# Patient Record
Sex: MALE | Race: WHITE | NOT HISPANIC OR LATINO | Employment: OTHER | RURAL
[De-identification: names, ages, dates, MRNs, and addresses within clinical notes are randomized per-mention and may not be internally consistent; named-entity substitution may affect disease eponyms.]

---

## 2017-01-09 RX ORDER — METOPROLOL SUCCINATE 50 MG/1
TABLET, EXTENDED RELEASE ORAL
Qty: 180 TABLET | Refills: 3 | Status: SHIPPED | OUTPATIENT
Start: 2017-01-09 | End: 2017-02-09

## 2017-02-09 ENCOUNTER — OFFICE VISIT (OUTPATIENT)
Dept: CARDIOLOGY | Facility: CLINIC | Age: 77
End: 2017-02-09

## 2017-02-09 VITALS
HEART RATE: 87 BPM | BODY MASS INDEX: 35.5 KG/M2 | SYSTOLIC BLOOD PRESSURE: 118 MMHG | HEIGHT: 70 IN | RESPIRATION RATE: 22 BRPM | DIASTOLIC BLOOD PRESSURE: 56 MMHG | WEIGHT: 248 LBS

## 2017-02-09 DIAGNOSIS — I48.20 CHRONIC ATRIAL FIBRILLATION (HCC): Primary | ICD-10-CM

## 2017-02-09 DIAGNOSIS — I50.42 CHRONIC COMBINED SYSTOLIC AND DIASTOLIC CONGESTIVE HEART FAILURE (HCC): ICD-10-CM

## 2017-02-09 PROCEDURE — 99213 OFFICE O/P EST LOW 20 MIN: CPT | Performed by: INTERNAL MEDICINE

## 2017-02-09 PROCEDURE — 93000 ELECTROCARDIOGRAM COMPLETE: CPT | Performed by: INTERNAL MEDICINE

## 2017-02-09 RX ORDER — FLUTICASONE PROPIONATE 50 MCG
2 SPRAY, SUSPENSION (ML) NASAL DAILY
COMMUNITY

## 2017-02-09 RX ORDER — METOPROLOL SUCCINATE 50 MG/1
50 TABLET, EXTENDED RELEASE ORAL 2 TIMES DAILY
COMMUNITY
End: 2017-11-30 | Stop reason: SDUPTHER

## 2017-02-09 RX ORDER — ACETAMINOPHEN,DIPHENHYDRAMINE HCL 500; 25 MG/1; MG/1
1 TABLET, FILM COATED ORAL NIGHTLY PRN
COMMUNITY

## 2017-02-09 NOTE — PROGRESS NOTES
Subjective:     Encounter Date:02/09/2017      Patient ID: Izaiah Ravi is a 76 y.o. male.    Chief Complaint: CAF, CHF    History of Present Illness     Dear Dr. Godfrey,    I had the pleasure of seeing the patient in cardiac followup today. As you well know, he is a rupa 76-year-old man with history of chronic atrial fibrillation and mild cardiomyopathy. With medical treatment, his ejection fraction improved to 50%. Last year he was doing very well with some occasional dietary indiscretion.     He comes in now for his yearly followup. Since I have last seen him, he has been suffering from C. difficile colitis. He is just starting to recover. Despite this, his warfarin level has stayed in good range, and his heart failure class is stable at I.         Review of Systems   All other systems reviewed and are negative.        ECG 12 Lead  Date/Time: 2/9/2017 4:01 PM  Performed by: CARLOS KNOWLES  Authorized by: CARLOS KNOWLES   Comparison: compared with previous ECG   Similar to previous ECG  Rhythm: atrial fibrillation  BPM: 87                 Objective:     Physical Exam   Constitutional: He is oriented to person, place, and time. He appears well-developed and well-nourished.   HENT:   Head: Normocephalic and atraumatic.   Neck: Normal range of motion. Neck supple.   Cardiovascular: Normal rate and normal heart sounds.  An irregularly irregular rhythm present.   Pulmonary/Chest: Effort normal and breath sounds normal.   Abdominal: Soft. Bowel sounds are normal.   Musculoskeletal: Normal range of motion.   Neurological: He is alert and oriented to person, place, and time.   Skin: Skin is warm and dry.   Psychiatric: He has a normal mood and affect. His behavior is normal. Thought content normal.   Vitals reviewed.      Lab Review:       Assessment:          Diagnosis Plan   1. Chronic atrial fibrillation     2. Chronic combined systolic and diastolic congestive heart failure            Plan:        It was a pleasure to  see your patient in cardiac followup today.  He is stable from the standpoint of his chronic atrial fibrillation.  He remains on warfarin for stroke prevention.  His heart failure class remains 1.  I have recommended no changes at this time.  He will see me again in one year or sooner if symptoms warrant.      Atrial Fibrillation and Atrial Flutter  Assessment  • The patient has permanent atrial fibrillation  • This is non-valvular in etiology  • The patient's CHADS2-VASc score is 3  • A [id number] score of 2 or more is considered a high risk for a thromboembolic event  • Warfarin prescribed  • Aspirin prescribed    Plan  • Continue in atrial fibrillation with rate control  • Continue aspirin and warfarin for antithrombotic therapy, bleeding issues discussed  • Continue beta blocker and digoxin for rate control

## 2017-05-28 ENCOUNTER — HOSPITAL ENCOUNTER (INPATIENT)
Facility: HOSPITAL | Age: 77
LOS: 4 days | Discharge: HOME | End: 2017-06-01
Attending: HOSPITALIST | Admitting: HOSPITALIST

## 2017-05-28 ENCOUNTER — APPOINTMENT (OUTPATIENT)
Dept: CT IMAGING | Facility: HOSPITAL | Age: 77
End: 2017-05-28

## 2017-05-28 DIAGNOSIS — Z74.09 IMPAIRED FUNCTIONAL MOBILITY, BALANCE, GAIT, AND ENDURANCE: Primary | ICD-10-CM

## 2017-05-28 DIAGNOSIS — S06.5XAA SUBDURAL HEMATOMA (HCC): ICD-10-CM

## 2017-05-28 PROBLEM — I10 HTN (HYPERTENSION): Status: ACTIVE | Noted: 2017-05-28

## 2017-05-28 PROBLEM — E87.1 HYPONATREMIA: Status: ACTIVE | Noted: 2017-05-28

## 2017-05-28 PROBLEM — R73.9 HYPERGLYCEMIA: Status: ACTIVE | Noted: 2017-05-28

## 2017-05-28 PROBLEM — D72.829 LEUKOCYTOSIS: Status: ACTIVE | Noted: 2017-05-28

## 2017-05-28 LAB
ANION GAP SERPL CALCULATED.3IONS-SCNC: 17.1 MMOL/L
BUN BLD-MCNC: 23 MG/DL (ref 8–23)
BUN/CREAT SERPL: 16.8 (ref 7–25)
CALCIUM SPEC-SCNC: 9.8 MG/DL (ref 8.6–10.5)
CHLORIDE SERPL-SCNC: 89 MMOL/L (ref 98–107)
CO2 SERPL-SCNC: 23.9 MMOL/L (ref 22–29)
CREAT BLD-MCNC: 1.37 MG/DL (ref 0.76–1.27)
DEPRECATED RDW RBC AUTO: 60.3 FL (ref 37–54)
ERYTHROCYTE [DISTWIDTH] IN BLOOD BY AUTOMATED COUNT: 21.2 % (ref 11.5–14.5)
GFR SERPL CREATININE-BSD FRML MDRD: 50 ML/MIN/1.73
GLUCOSE BLD-MCNC: 163 MG/DL (ref 65–99)
HCT VFR BLD AUTO: 39.9 % (ref 40.4–52.2)
HGB BLD-MCNC: 13 G/DL (ref 13.7–17.6)
INR PPP: 1.69 (ref 0.9–1.1)
MCH RBC QN AUTO: 25.6 PG (ref 27–32.7)
MCHC RBC AUTO-ENTMCNC: 32.6 G/DL (ref 32.6–36.4)
MCV RBC AUTO: 78.5 FL (ref 79.8–96.2)
PLATELET # BLD AUTO: 298 10*3/MM3 (ref 140–500)
PMV BLD AUTO: 10.7 FL (ref 6–12)
POTASSIUM BLD-SCNC: 4.5 MMOL/L (ref 3.5–5.2)
PROTHROMBIN TIME: 19.3 SECONDS (ref 11.7–14.2)
RBC # BLD AUTO: 5.08 10*6/MM3 (ref 4.6–6)
SODIUM BLD-SCNC: 130 MMOL/L (ref 136–145)
WBC NRBC COR # BLD: 17.96 10*3/MM3 (ref 4.5–10.7)

## 2017-05-28 PROCEDURE — 85610 PROTHROMBIN TIME: CPT | Performed by: HOSPITALIST

## 2017-05-28 PROCEDURE — 80048 BASIC METABOLIC PNL TOTAL CA: CPT | Performed by: HOSPITALIST

## 2017-05-28 PROCEDURE — 93005 ELECTROCARDIOGRAM TRACING: CPT | Performed by: HOSPITALIST

## 2017-05-28 PROCEDURE — 85027 COMPLETE CBC AUTOMATED: CPT | Performed by: HOSPITALIST

## 2017-05-28 PROCEDURE — 93010 ELECTROCARDIOGRAM REPORT: CPT | Performed by: INTERNAL MEDICINE

## 2017-05-28 PROCEDURE — 70450 CT HEAD/BRAIN W/O DYE: CPT

## 2017-05-28 RX ORDER — FUROSEMIDE 20 MG/1
20 TABLET ORAL 2 TIMES DAILY
Status: DISCONTINUED | OUTPATIENT
Start: 2017-05-29 | End: 2017-06-01 | Stop reason: HOSPADM

## 2017-05-28 RX ORDER — TERAZOSIN 2 MG/1
2 CAPSULE ORAL NIGHTLY
Status: DISCONTINUED | OUTPATIENT
Start: 2017-05-29 | End: 2017-06-01 | Stop reason: HOSPADM

## 2017-05-28 RX ORDER — BENAZEPRIL HYDROCHLORIDE 20 MG/1
20 TABLET ORAL DAILY
Status: DISCONTINUED | OUTPATIENT
Start: 2017-05-29 | End: 2017-05-29 | Stop reason: CLARIF

## 2017-05-28 RX ORDER — ONDANSETRON 4 MG/1
4 TABLET, FILM COATED ORAL EVERY 6 HOURS PRN
Status: DISCONTINUED | OUTPATIENT
Start: 2017-05-28 | End: 2017-06-01 | Stop reason: HOSPADM

## 2017-05-28 RX ORDER — METOPROLOL SUCCINATE 50 MG/1
50 TABLET, EXTENDED RELEASE ORAL 2 TIMES DAILY
Status: DISCONTINUED | OUTPATIENT
Start: 2017-05-29 | End: 2017-06-01 | Stop reason: HOSPADM

## 2017-05-28 RX ORDER — SODIUM CHLORIDE 0.9 % (FLUSH) 0.9 %
1-10 SYRINGE (ML) INJECTION AS NEEDED
Status: DISCONTINUED | OUTPATIENT
Start: 2017-05-28 | End: 2017-05-30

## 2017-05-28 RX ORDER — ATORVASTATIN CALCIUM 10 MG/1
10 TABLET, FILM COATED ORAL NIGHTLY
Status: DISCONTINUED | OUTPATIENT
Start: 2017-05-28 | End: 2017-06-01 | Stop reason: HOSPADM

## 2017-05-28 RX ORDER — ONDANSETRON 4 MG/1
4 TABLET, ORALLY DISINTEGRATING ORAL EVERY 6 HOURS PRN
Status: DISCONTINUED | OUTPATIENT
Start: 2017-05-28 | End: 2017-06-01 | Stop reason: HOSPADM

## 2017-05-28 RX ORDER — METOPROLOL SUCCINATE 50 MG/1
50 TABLET, EXTENDED RELEASE ORAL ONCE
Status: COMPLETED | OUTPATIENT
Start: 2017-05-28 | End: 2017-05-28

## 2017-05-28 RX ORDER — SODIUM CHLORIDE 9 MG/ML
100 INJECTION, SOLUTION INTRAVENOUS CONTINUOUS
Status: DISCONTINUED | OUTPATIENT
Start: 2017-05-28 | End: 2017-05-31 | Stop reason: ALTCHOICE

## 2017-05-28 RX ORDER — BISACODYL 5 MG/1
5 TABLET, DELAYED RELEASE ORAL DAILY PRN
Status: DISCONTINUED | OUTPATIENT
Start: 2017-05-28 | End: 2017-06-01 | Stop reason: HOSPADM

## 2017-05-28 RX ORDER — ACETAMINOPHEN 325 MG/1
650 TABLET ORAL EVERY 4 HOURS PRN
Status: DISCONTINUED | OUTPATIENT
Start: 2017-05-28 | End: 2017-05-31

## 2017-05-28 RX ORDER — DIGOXIN 125 MCG
125 TABLET ORAL DAILY
Status: DISCONTINUED | OUTPATIENT
Start: 2017-05-29 | End: 2017-06-01 | Stop reason: HOSPADM

## 2017-05-28 RX ORDER — DIGOXIN 125 MCG
125 TABLET ORAL ONCE
Status: COMPLETED | OUTPATIENT
Start: 2017-05-28 | End: 2017-05-28

## 2017-05-28 RX ORDER — FAMOTIDINE 20 MG/1
20 TABLET, FILM COATED ORAL 2 TIMES DAILY
Status: DISCONTINUED | OUTPATIENT
Start: 2017-05-28 | End: 2017-05-30

## 2017-05-28 RX ORDER — ONDANSETRON 2 MG/ML
4 INJECTION INTRAMUSCULAR; INTRAVENOUS EVERY 6 HOURS PRN
Status: DISCONTINUED | OUTPATIENT
Start: 2017-05-28 | End: 2017-06-01 | Stop reason: HOSPADM

## 2017-05-28 RX ORDER — BISACODYL 10 MG
10 SUPPOSITORY, RECTAL RECTAL DAILY PRN
Status: DISCONTINUED | OUTPATIENT
Start: 2017-05-28 | End: 2017-06-01 | Stop reason: HOSPADM

## 2017-05-28 RX ORDER — HYDROCODONE BITARTRATE AND ACETAMINOPHEN 5; 325 MG/1; MG/1
1 TABLET ORAL EVERY 4 HOURS PRN
Status: DISCONTINUED | OUTPATIENT
Start: 2017-05-28 | End: 2017-06-01 | Stop reason: HOSPADM

## 2017-05-28 RX ORDER — DOXAZOSIN 2 MG/1
2 TABLET ORAL DAILY
Status: DISCONTINUED | OUTPATIENT
Start: 2017-05-29 | End: 2017-05-28 | Stop reason: CLARIF

## 2017-05-28 RX ORDER — NITROGLYCERIN 0.4 MG/1
0.4 TABLET SUBLINGUAL
Status: DISCONTINUED | OUTPATIENT
Start: 2017-05-28 | End: 2017-06-01 | Stop reason: HOSPADM

## 2017-05-28 RX ORDER — FLUTICASONE PROPIONATE 50 MCG
2 SPRAY, SUSPENSION (ML) NASAL DAILY
Status: DISCONTINUED | OUTPATIENT
Start: 2017-05-29 | End: 2017-06-01 | Stop reason: HOSPADM

## 2017-05-28 RX ADMIN — DIGOXIN 125 MCG: 125 TABLET ORAL at 23:20

## 2017-05-28 RX ADMIN — METOPROLOL SUCCINATE 50 MG: 50 TABLET, FILM COATED, EXTENDED RELEASE ORAL at 23:20

## 2017-05-28 RX ADMIN — SODIUM CHLORIDE 100 ML/HR: 9 INJECTION, SOLUTION INTRAVENOUS at 22:12

## 2017-05-29 ENCOUNTER — APPOINTMENT (OUTPATIENT)
Dept: CT IMAGING | Facility: HOSPITAL | Age: 77
End: 2017-05-29

## 2017-05-29 ENCOUNTER — APPOINTMENT (OUTPATIENT)
Dept: MRI IMAGING | Facility: HOSPITAL | Age: 77
End: 2017-05-29

## 2017-05-29 PROBLEM — G93.5 BRAIN COMPRESSION (HCC): Status: ACTIVE | Noted: 2017-05-29

## 2017-05-29 LAB
ANION GAP SERPL CALCULATED.3IONS-SCNC: 16.9 MMOL/L
BUN BLD-MCNC: 22 MG/DL (ref 8–23)
BUN/CREAT SERPL: 19.6 (ref 7–25)
CALCIUM SPEC-SCNC: 9.4 MG/DL (ref 8.6–10.5)
CHLORIDE SERPL-SCNC: 94 MMOL/L (ref 98–107)
CO2 SERPL-SCNC: 21.1 MMOL/L (ref 22–29)
CREAT BLD-MCNC: 1.12 MG/DL (ref 0.76–1.27)
DEPRECATED RDW RBC AUTO: 61.3 FL (ref 37–54)
ERYTHROCYTE [DISTWIDTH] IN BLOOD BY AUTOMATED COUNT: 21.2 % (ref 11.5–14.5)
GFR SERPL CREATININE-BSD FRML MDRD: 64 ML/MIN/1.73
GLUCOSE BLD-MCNC: 176 MG/DL (ref 65–99)
HCT VFR BLD AUTO: 38.3 % (ref 40.4–52.2)
HGB BLD-MCNC: 12.1 G/DL (ref 13.7–17.6)
INR PPP: 1.45 (ref 0.9–1.1)
MAGNESIUM SERPL-MCNC: 2.5 MG/DL (ref 1.6–2.4)
MCH RBC QN AUTO: 25.4 PG (ref 27–32.7)
MCHC RBC AUTO-ENTMCNC: 31.6 G/DL (ref 32.6–36.4)
MCV RBC AUTO: 80.3 FL (ref 79.8–96.2)
PHOSPHATE SERPL-MCNC: 3.4 MG/DL (ref 2.5–4.5)
PLATELET # BLD AUTO: 274 10*3/MM3 (ref 140–500)
PMV BLD AUTO: 10.7 FL (ref 6–12)
POTASSIUM BLD-SCNC: 4.1 MMOL/L (ref 3.5–5.2)
PROTHROMBIN TIME: 17.1 SECONDS (ref 11.7–14.2)
RBC # BLD AUTO: 4.77 10*6/MM3 (ref 4.6–6)
SODIUM BLD-SCNC: 132 MMOL/L (ref 136–145)
WBC NRBC COR # BLD: 13.64 10*3/MM3 (ref 4.5–10.7)

## 2017-05-29 PROCEDURE — A9577 INJ MULTIHANCE: HCPCS | Performed by: HOSPITALIST

## 2017-05-29 PROCEDURE — 80048 BASIC METABOLIC PNL TOTAL CA: CPT | Performed by: HOSPITALIST

## 2017-05-29 PROCEDURE — 85027 COMPLETE CBC AUTOMATED: CPT | Performed by: HOSPITALIST

## 2017-05-29 PROCEDURE — 85610 PROTHROMBIN TIME: CPT | Performed by: HOSPITALIST

## 2017-05-29 PROCEDURE — 84100 ASSAY OF PHOSPHORUS: CPT | Performed by: HOSPITALIST

## 2017-05-29 PROCEDURE — 97161 PT EVAL LOW COMPLEX 20 MIN: CPT | Performed by: PHYSICAL THERAPIST

## 2017-05-29 PROCEDURE — 83735 ASSAY OF MAGNESIUM: CPT | Performed by: HOSPITALIST

## 2017-05-29 PROCEDURE — 63710000001 DIPHENHYDRAMINE PER 50 MG: Performed by: HOSPITALIST

## 2017-05-29 PROCEDURE — 70450 CT HEAD/BRAIN W/O DYE: CPT

## 2017-05-29 PROCEDURE — 99222 1ST HOSP IP/OBS MODERATE 55: CPT | Performed by: INTERNAL MEDICINE

## 2017-05-29 PROCEDURE — 70553 MRI BRAIN STEM W/O & W/DYE: CPT

## 2017-05-29 PROCEDURE — 0 GADOBENATE DIMEGLUMINE 529 MG/ML SOLUTION: Performed by: HOSPITALIST

## 2017-05-29 RX ORDER — LISINOPRIL 20 MG/1
20 TABLET ORAL
Status: DISCONTINUED | OUTPATIENT
Start: 2017-05-29 | End: 2017-06-01 | Stop reason: HOSPADM

## 2017-05-29 RX ORDER — DIPHENHYDRAMINE HCL 50 MG
50 CAPSULE ORAL ONCE
Status: COMPLETED | OUTPATIENT
Start: 2017-05-29 | End: 2017-05-29

## 2017-05-29 RX ORDER — DIAZEPAM 5 MG/1
10 TABLET ORAL ONCE AS NEEDED
Status: COMPLETED | OUTPATIENT
Start: 2017-05-29 | End: 2017-05-29

## 2017-05-29 RX ORDER — ACETAMINOPHEN 325 MG/1
650 TABLET ORAL ONCE
Status: COMPLETED | OUTPATIENT
Start: 2017-05-29 | End: 2017-05-29

## 2017-05-29 RX ADMIN — DIPHENHYDRAMINE HYDROCHLORIDE 50 MG: 50 CAPSULE ORAL at 20:58

## 2017-05-29 RX ADMIN — ACETAMINOPHEN 650 MG: 325 TABLET ORAL at 20:58

## 2017-05-29 RX ADMIN — TERAZOSIN HYDROCHLORIDE 2 MG: 2 CAPSULE ORAL at 20:53

## 2017-05-29 RX ADMIN — HYDROCODONE BITARTRATE AND ACETAMINOPHEN 1 TABLET: 5; 325 TABLET ORAL at 00:31

## 2017-05-29 RX ADMIN — HYDROCODONE BITARTRATE AND ACETAMINOPHEN 1 TABLET: 5; 325 TABLET ORAL at 11:32

## 2017-05-29 RX ADMIN — METOPROLOL SUCCINATE 50 MG: 50 TABLET, FILM COATED, EXTENDED RELEASE ORAL at 18:13

## 2017-05-29 RX ADMIN — FAMOTIDINE 20 MG: 20 TABLET, FILM COATED ORAL at 18:13

## 2017-05-29 RX ADMIN — ATORVASTATIN CALCIUM 10 MG: 10 TABLET, FILM COATED ORAL at 01:14

## 2017-05-29 RX ADMIN — FAMOTIDINE 20 MG: 20 TABLET, FILM COATED ORAL at 00:31

## 2017-05-29 RX ADMIN — FLUTICASONE PROPIONATE 2 SPRAY: 50 SPRAY, METERED NASAL at 09:29

## 2017-05-29 RX ADMIN — HYDROCODONE BITARTRATE AND ACETAMINOPHEN 1 TABLET: 5; 325 TABLET ORAL at 07:13

## 2017-05-29 RX ADMIN — LISINOPRIL 20 MG: 20 TABLET ORAL at 09:28

## 2017-05-29 RX ADMIN — ATORVASTATIN CALCIUM 10 MG: 10 TABLET, FILM COATED ORAL at 20:54

## 2017-05-29 RX ADMIN — DIGOXIN 125 MCG: 125 TABLET ORAL at 09:28

## 2017-05-29 RX ADMIN — ACETAMINOPHEN 650 MG: 325 TABLET ORAL at 16:03

## 2017-05-29 RX ADMIN — METOPROLOL SUCCINATE 50 MG: 50 TABLET, FILM COATED, EXTENDED RELEASE ORAL at 09:28

## 2017-05-29 RX ADMIN — FUROSEMIDE 20 MG: 20 TABLET ORAL at 09:28

## 2017-05-29 RX ADMIN — FAMOTIDINE 20 MG: 20 TABLET, FILM COATED ORAL at 09:28

## 2017-05-29 RX ADMIN — GADOBENATE DIMEGLUMINE 20 ML: 529 INJECTION, SOLUTION INTRAVENOUS at 16:00

## 2017-05-29 RX ADMIN — FUROSEMIDE 20 MG: 20 TABLET ORAL at 18:13

## 2017-05-29 RX ADMIN — DIAZEPAM 10 MG: 5 TABLET ORAL at 14:40

## 2017-05-30 ENCOUNTER — ANESTHESIA EVENT (OUTPATIENT)
Dept: PERIOP | Facility: HOSPITAL | Age: 77
End: 2017-05-30

## 2017-05-30 ENCOUNTER — APPOINTMENT (OUTPATIENT)
Dept: CT IMAGING | Facility: HOSPITAL | Age: 77
End: 2017-05-30

## 2017-05-30 ENCOUNTER — ANESTHESIA (OUTPATIENT)
Dept: PERIOP | Facility: HOSPITAL | Age: 77
End: 2017-05-30

## 2017-05-30 LAB
ALBUMIN SERPL-MCNC: 3.8 G/DL (ref 3.5–5.2)
ALBUMIN/GLOB SERPL: 1.2 G/DL
ALP SERPL-CCNC: 56 U/L (ref 39–117)
ALT SERPL W P-5'-P-CCNC: 20 U/L (ref 1–41)
ANION GAP SERPL CALCULATED.3IONS-SCNC: 13 MMOL/L
APTT PPP: 34.9 SECONDS (ref 22.7–35.4)
AST SERPL-CCNC: 19 U/L (ref 1–40)
BASOPHILS # BLD AUTO: 0.01 10*3/MM3 (ref 0–0.2)
BASOPHILS NFR BLD AUTO: 0.1 % (ref 0–1.5)
BILIRUB SERPL-MCNC: 0.5 MG/DL (ref 0.1–1.2)
BUN BLD-MCNC: 20 MG/DL (ref 8–23)
BUN/CREAT SERPL: 19.2 (ref 7–25)
CALCIUM SPEC-SCNC: 8.7 MG/DL (ref 8.6–10.5)
CHLORIDE SERPL-SCNC: 93 MMOL/L (ref 98–107)
CO2 SERPL-SCNC: 26 MMOL/L (ref 22–29)
CREAT BLD-MCNC: 1.04 MG/DL (ref 0.76–1.27)
DEPRECATED RDW RBC AUTO: 63.2 FL (ref 37–54)
EOSINOPHIL # BLD AUTO: 0 10*3/MM3 (ref 0–0.7)
EOSINOPHIL NFR BLD AUTO: 0 % (ref 0.3–6.2)
ERYTHROCYTE [DISTWIDTH] IN BLOOD BY AUTOMATED COUNT: 21.5 % (ref 11.5–14.5)
GFR SERPL CREATININE-BSD FRML MDRD: 69 ML/MIN/1.73
GLOBULIN UR ELPH-MCNC: 3.3 GM/DL
GLUCOSE BLD-MCNC: 147 MG/DL (ref 65–99)
GLUCOSE BLDC GLUCOMTR-MCNC: 137 MG/DL (ref 70–130)
HBA1C MFR BLD: 5.98 % (ref 4.8–5.6)
HCT VFR BLD AUTO: 37.4 % (ref 40.4–52.2)
HGB BLD-MCNC: 11.7 G/DL (ref 13.7–17.6)
IMM GRANULOCYTES # BLD: 0.03 10*3/MM3 (ref 0–0.03)
IMM GRANULOCYTES NFR BLD: 0.2 % (ref 0–0.5)
INR PPP: 1.42 (ref 0.9–1.1)
LYMPHOCYTES # BLD AUTO: 0.75 10*3/MM3 (ref 0.9–4.8)
LYMPHOCYTES NFR BLD AUTO: 4.6 % (ref 19.6–45.3)
MCH RBC QN AUTO: 25.1 PG (ref 27–32.7)
MCHC RBC AUTO-ENTMCNC: 31.3 G/DL (ref 32.6–36.4)
MCV RBC AUTO: 80.3 FL (ref 79.8–96.2)
MONOCYTES # BLD AUTO: 0.52 10*3/MM3 (ref 0.2–1.2)
MONOCYTES NFR BLD AUTO: 3.2 % (ref 5–12)
NEUTROPHILS # BLD AUTO: 14.95 10*3/MM3 (ref 1.9–8.1)
NEUTROPHILS NFR BLD AUTO: 91.9 % (ref 42.7–76)
PLATELET # BLD AUTO: 241 10*3/MM3 (ref 140–500)
PMV BLD AUTO: 10.7 FL (ref 6–12)
POTASSIUM BLD-SCNC: 4.5 MMOL/L (ref 3.5–5.2)
PROT SERPL-MCNC: 7.1 G/DL (ref 6–8.5)
PROTHROMBIN TIME: 16.8 SECONDS (ref 11.7–14.2)
RBC # BLD AUTO: 4.66 10*6/MM3 (ref 4.6–6)
SODIUM BLD-SCNC: 132 MMOL/L (ref 136–145)
WBC NRBC COR # BLD: 16.26 10*3/MM3 (ref 4.5–10.7)

## 2017-05-30 PROCEDURE — 85025 COMPLETE CBC W/AUTO DIFF WBC: CPT | Performed by: HOSPITALIST

## 2017-05-30 PROCEDURE — 70450 CT HEAD/BRAIN W/O DYE: CPT

## 2017-05-30 PROCEDURE — 25010000002 ONDANSETRON PER 1 MG: Performed by: ANESTHESIOLOGY

## 2017-05-30 PROCEDURE — 25010000002 NEOSTIGMINE 10 MG/10ML SOLUTION: Performed by: ANESTHESIOLOGY

## 2017-05-30 PROCEDURE — 25010000002 MIDAZOLAM PER 1 MG

## 2017-05-30 PROCEDURE — 25010000002 PROPOFOL 10 MG/ML EMULSION: Performed by: ANESTHESIOLOGY

## 2017-05-30 PROCEDURE — 25010000002 DEXAMETHASONE PER 1 MG: Performed by: ANESTHESIOLOGY

## 2017-05-30 PROCEDURE — 25810000003 SODIUM CHLORIDE 0.9 % WITH KCL 20 MEQ 20-0.9 MEQ/L-% SOLUTION: Performed by: NEUROLOGICAL SURGERY

## 2017-05-30 PROCEDURE — 85730 THROMBOPLASTIN TIME PARTIAL: CPT | Performed by: NEUROLOGICAL SURGERY

## 2017-05-30 PROCEDURE — 00C40ZZ EXTIRPATION OF MATTER FROM INTRACRANIAL SUBDURAL SPACE, OPEN APPROACH: ICD-10-PCS | Performed by: NEUROLOGICAL SURGERY

## 2017-05-30 PROCEDURE — 61154 BURR HOLE W/EVAC&/DRG HMTMA: CPT | Performed by: NEUROLOGICAL SURGERY

## 2017-05-30 PROCEDURE — 85610 PROTHROMBIN TIME: CPT | Performed by: HOSPITALIST

## 2017-05-30 PROCEDURE — 99232 SBSQ HOSP IP/OBS MODERATE 35: CPT | Performed by: INTERNAL MEDICINE

## 2017-05-30 PROCEDURE — 25010000002 FENTANYL CITRATE (PF) 100 MCG/2ML SOLUTION

## 2017-05-30 PROCEDURE — 25010000003 CEFAZOLIN IN DEXTROSE 2-4 GM/100ML-% SOLUTION: Performed by: NEUROLOGICAL SURGERY

## 2017-05-30 PROCEDURE — 82962 GLUCOSE BLOOD TEST: CPT

## 2017-05-30 PROCEDURE — 80053 COMPREHEN METABOLIC PANEL: CPT | Performed by: HOSPITALIST

## 2017-05-30 PROCEDURE — 25010000003 CEFAZOLIN IN DEXTROSE 2-4 GM/100ML-% SOLUTION: Performed by: ANESTHESIOLOGY

## 2017-05-30 PROCEDURE — 83036 HEMOGLOBIN GLYCOSYLATED A1C: CPT | Performed by: HOSPITALIST

## 2017-05-30 PROCEDURE — 25010000002 FENTANYL CITRATE (PF) 100 MCG/2ML SOLUTION: Performed by: ANESTHESIOLOGY

## 2017-05-30 RX ORDER — PROMETHAZINE HYDROCHLORIDE 25 MG/1
25 SUPPOSITORY RECTAL ONCE AS NEEDED
Status: DISCONTINUED | OUTPATIENT
Start: 2017-05-30 | End: 2017-05-30 | Stop reason: HOSPADM

## 2017-05-30 RX ORDER — PROMETHAZINE HYDROCHLORIDE 25 MG/1
12.5 TABLET ORAL ONCE AS NEEDED
Status: DISCONTINUED | OUTPATIENT
Start: 2017-05-30 | End: 2017-05-30 | Stop reason: HOSPADM

## 2017-05-30 RX ORDER — SODIUM CHLORIDE AND POTASSIUM CHLORIDE 150; 900 MG/100ML; MG/100ML
100 INJECTION, SOLUTION INTRAVENOUS CONTINUOUS
Status: DISCONTINUED | OUTPATIENT
Start: 2017-05-30 | End: 2017-05-31 | Stop reason: ALTCHOICE

## 2017-05-30 RX ORDER — ONDANSETRON 2 MG/ML
INJECTION INTRAMUSCULAR; INTRAVENOUS AS NEEDED
Status: DISCONTINUED | OUTPATIENT
Start: 2017-05-30 | End: 2017-05-30 | Stop reason: SURG

## 2017-05-30 RX ORDER — DIPHENHYDRAMINE HYDROCHLORIDE 50 MG/ML
12.5 INJECTION INTRAMUSCULAR; INTRAVENOUS
Status: DISCONTINUED | OUTPATIENT
Start: 2017-05-30 | End: 2017-05-30 | Stop reason: HOSPADM

## 2017-05-30 RX ORDER — ROCURONIUM BROMIDE 10 MG/ML
INJECTION, SOLUTION INTRAVENOUS AS NEEDED
Status: DISCONTINUED | OUTPATIENT
Start: 2017-05-30 | End: 2017-05-30 | Stop reason: SURG

## 2017-05-30 RX ORDER — MIDAZOLAM HYDROCHLORIDE 1 MG/ML
0.5 INJECTION INTRAMUSCULAR; INTRAVENOUS
Status: COMPLETED | OUTPATIENT
Start: 2017-05-30 | End: 2017-05-30

## 2017-05-30 RX ORDER — PROPOFOL 10 MG/ML
VIAL (ML) INTRAVENOUS AS NEEDED
Status: DISCONTINUED | OUTPATIENT
Start: 2017-05-30 | End: 2017-05-30 | Stop reason: SURG

## 2017-05-30 RX ORDER — HYDROCODONE BITARTRATE AND ACETAMINOPHEN 5; 325 MG/1; MG/1
1 TABLET ORAL EVERY 4 HOURS PRN
Status: DISCONTINUED | OUTPATIENT
Start: 2017-05-30 | End: 2017-05-31

## 2017-05-30 RX ORDER — LABETALOL HYDROCHLORIDE 5 MG/ML
5 INJECTION, SOLUTION INTRAVENOUS
Status: DISCONTINUED | OUTPATIENT
Start: 2017-05-30 | End: 2017-05-30 | Stop reason: HOSPADM

## 2017-05-30 RX ORDER — CEFAZOLIN SODIUM 2 G/100ML
INJECTION, SOLUTION INTRAVENOUS AS NEEDED
Status: DISCONTINUED | OUTPATIENT
Start: 2017-05-30 | End: 2017-05-30 | Stop reason: SURG

## 2017-05-30 RX ORDER — HYDROMORPHONE HYDROCHLORIDE 1 MG/ML
0.5 INJECTION, SOLUTION INTRAMUSCULAR; INTRAVENOUS; SUBCUTANEOUS
Status: DISCONTINUED | OUTPATIENT
Start: 2017-05-30 | End: 2017-05-30 | Stop reason: HOSPADM

## 2017-05-30 RX ORDER — NALOXONE HCL 0.4 MG/ML
0.2 VIAL (ML) INJECTION AS NEEDED
Status: DISCONTINUED | OUTPATIENT
Start: 2017-05-30 | End: 2017-05-30 | Stop reason: HOSPADM

## 2017-05-30 RX ORDER — GLYCOPYRROLATE 0.2 MG/ML
INJECTION INTRAMUSCULAR; INTRAVENOUS AS NEEDED
Status: DISCONTINUED | OUTPATIENT
Start: 2017-05-30 | End: 2017-05-30 | Stop reason: SURG

## 2017-05-30 RX ORDER — PROPOFOL 10 MG/ML
VIAL (ML) INTRAVENOUS AS NEEDED
Status: DISCONTINUED | OUTPATIENT
Start: 2017-05-30 | End: 2017-05-30

## 2017-05-30 RX ORDER — FENTANYL CITRATE 50 UG/ML
INJECTION, SOLUTION INTRAMUSCULAR; INTRAVENOUS
Status: COMPLETED
Start: 2017-05-30 | End: 2017-05-30

## 2017-05-30 RX ORDER — FENTANYL CITRATE 50 UG/ML
50 INJECTION, SOLUTION INTRAMUSCULAR; INTRAVENOUS
Status: DISCONTINUED | OUTPATIENT
Start: 2017-05-30 | End: 2017-05-30 | Stop reason: HOSPADM

## 2017-05-30 RX ORDER — SODIUM CHLORIDE, SODIUM ACETATE ANHYDROUS, SODIUM GLUCONATE, POTASSIUM CHLORIDE, AND MAGNESIUM CHLORIDE 526; 222; 502; 37; 30 MG/100ML; MG/100ML; MG/100ML; MG/100ML; MG/100ML
IRRIGANT IRRIGATION AS NEEDED
Status: DISCONTINUED | OUTPATIENT
Start: 2017-05-30 | End: 2017-05-30 | Stop reason: HOSPADM

## 2017-05-30 RX ORDER — PROMETHAZINE HYDROCHLORIDE 25 MG/ML
12.5 INJECTION, SOLUTION INTRAMUSCULAR; INTRAVENOUS ONCE AS NEEDED
Status: DISCONTINUED | OUTPATIENT
Start: 2017-05-30 | End: 2017-05-30 | Stop reason: HOSPADM

## 2017-05-30 RX ORDER — DEXAMETHASONE SODIUM PHOSPHATE 10 MG/ML
INJECTION INTRAMUSCULAR; INTRAVENOUS AS NEEDED
Status: DISCONTINUED | OUTPATIENT
Start: 2017-05-30 | End: 2017-05-30 | Stop reason: SURG

## 2017-05-30 RX ORDER — CEFAZOLIN SODIUM 2 G/100ML
2 INJECTION, SOLUTION INTRAVENOUS EVERY 8 HOURS
Status: COMPLETED | OUTPATIENT
Start: 2017-05-30 | End: 2017-06-01

## 2017-05-30 RX ORDER — DOCUSATE SODIUM 100 MG/1
100 CAPSULE, LIQUID FILLED ORAL 2 TIMES DAILY
Status: DISCONTINUED | OUTPATIENT
Start: 2017-05-30 | End: 2017-06-01 | Stop reason: HOSPADM

## 2017-05-30 RX ORDER — FAMOTIDINE 10 MG/ML
INJECTION, SOLUTION INTRAVENOUS
Status: COMPLETED
Start: 2017-05-30 | End: 2017-05-30

## 2017-05-30 RX ORDER — HYDROCODONE BITARTRATE AND ACETAMINOPHEN 7.5; 325 MG/1; MG/1
1 TABLET ORAL ONCE AS NEEDED
Status: DISCONTINUED | OUTPATIENT
Start: 2017-05-30 | End: 2017-05-30 | Stop reason: HOSPADM

## 2017-05-30 RX ORDER — PROMETHAZINE HYDROCHLORIDE 25 MG/1
25 TABLET ORAL ONCE AS NEEDED
Status: DISCONTINUED | OUTPATIENT
Start: 2017-05-30 | End: 2017-05-30 | Stop reason: HOSPADM

## 2017-05-30 RX ORDER — ONDANSETRON 2 MG/ML
4 INJECTION INTRAMUSCULAR; INTRAVENOUS ONCE AS NEEDED
Status: DISCONTINUED | OUTPATIENT
Start: 2017-05-30 | End: 2017-05-30 | Stop reason: HOSPADM

## 2017-05-30 RX ORDER — HYDRALAZINE HYDROCHLORIDE 20 MG/ML
5 INJECTION INTRAMUSCULAR; INTRAVENOUS
Status: DISCONTINUED | OUTPATIENT
Start: 2017-05-30 | End: 2017-05-30 | Stop reason: HOSPADM

## 2017-05-30 RX ORDER — LIDOCAINE HYDROCHLORIDE 20 MG/ML
INJECTION, SOLUTION INFILTRATION; PERINEURAL AS NEEDED
Status: DISCONTINUED | OUTPATIENT
Start: 2017-05-30 | End: 2017-05-30 | Stop reason: SURG

## 2017-05-30 RX ORDER — FENTANYL CITRATE 50 UG/ML
INJECTION, SOLUTION INTRAMUSCULAR; INTRAVENOUS AS NEEDED
Status: DISCONTINUED | OUTPATIENT
Start: 2017-05-30 | End: 2017-05-30 | Stop reason: SURG

## 2017-05-30 RX ORDER — SODIUM CHLORIDE 0.9 % (FLUSH) 0.9 %
1-10 SYRINGE (ML) INJECTION AS NEEDED
Status: DISCONTINUED | OUTPATIENT
Start: 2017-05-30 | End: 2017-05-30 | Stop reason: HOSPADM

## 2017-05-30 RX ORDER — OXYCODONE AND ACETAMINOPHEN 7.5; 325 MG/1; MG/1
1 TABLET ORAL ONCE AS NEEDED
Status: DISCONTINUED | OUTPATIENT
Start: 2017-05-30 | End: 2017-05-30 | Stop reason: HOSPADM

## 2017-05-30 RX ORDER — ACETAMINOPHEN 325 MG/1
650 TABLET ORAL EVERY 4 HOURS PRN
Status: DISCONTINUED | OUTPATIENT
Start: 2017-05-30 | End: 2017-06-01 | Stop reason: HOSPADM

## 2017-05-30 RX ORDER — FLUMAZENIL 0.1 MG/ML
0.2 INJECTION INTRAVENOUS AS NEEDED
Status: DISCONTINUED | OUTPATIENT
Start: 2017-05-30 | End: 2017-05-30 | Stop reason: HOSPADM

## 2017-05-30 RX ORDER — CEFAZOLIN SODIUM 2 G/100ML
2 INJECTION, SOLUTION INTRAVENOUS
Status: COMPLETED | OUTPATIENT
Start: 2017-05-30 | End: 2017-05-30

## 2017-05-30 RX ORDER — FAMOTIDINE 10 MG/ML
20 INJECTION, SOLUTION INTRAVENOUS ONCE
Status: COMPLETED | OUTPATIENT
Start: 2017-05-30 | End: 2017-05-30

## 2017-05-30 RX ORDER — SODIUM CHLORIDE, SODIUM LACTATE, POTASSIUM CHLORIDE, CALCIUM CHLORIDE 600; 310; 30; 20 MG/100ML; MG/100ML; MG/100ML; MG/100ML
9 INJECTION, SOLUTION INTRAVENOUS CONTINUOUS
Status: DISCONTINUED | OUTPATIENT
Start: 2017-05-30 | End: 2017-05-30

## 2017-05-30 RX ORDER — MIDAZOLAM HYDROCHLORIDE 1 MG/ML
INJECTION INTRAMUSCULAR; INTRAVENOUS
Status: COMPLETED
Start: 2017-05-30 | End: 2017-05-30

## 2017-05-30 RX ORDER — NEOSTIGMINE METHYLSULFATE 1 MG/ML
INJECTION, SOLUTION INTRAVENOUS AS NEEDED
Status: DISCONTINUED | OUTPATIENT
Start: 2017-05-30 | End: 2017-05-30 | Stop reason: SURG

## 2017-05-30 RX ADMIN — FAMOTIDINE 20 MG: 10 INJECTION, SOLUTION INTRAVENOUS at 00:56

## 2017-05-30 RX ADMIN — FUROSEMIDE 20 MG: 20 TABLET ORAL at 08:43

## 2017-05-30 RX ADMIN — FENTANYL CITRATE 50 MCG: 50 INJECTION INTRAMUSCULAR; INTRAVENOUS at 01:21

## 2017-05-30 RX ADMIN — PROPOFOL 120 MG: 10 INJECTION, EMULSION INTRAVENOUS at 01:21

## 2017-05-30 RX ADMIN — DOCUSATE SODIUM 100 MG: 100 CAPSULE, LIQUID FILLED ORAL at 17:30

## 2017-05-30 RX ADMIN — ROCURONIUM BROMIDE 35 MG: 10 INJECTION INTRAVENOUS at 01:21

## 2017-05-30 RX ADMIN — CEFAZOLIN SODIUM 2 G: 2 INJECTION, SOLUTION INTRAVENOUS at 17:30

## 2017-05-30 RX ADMIN — GLYCOPYRROLATE 0.4 MG: 0.2 INJECTION INTRAMUSCULAR; INTRAVENOUS at 02:00

## 2017-05-30 RX ADMIN — METOPROLOL SUCCINATE 50 MG: 50 TABLET, FILM COATED, EXTENDED RELEASE ORAL at 19:09

## 2017-05-30 RX ADMIN — SODIUM CHLORIDE, POTASSIUM CHLORIDE, SODIUM LACTATE AND CALCIUM CHLORIDE: 600; 310; 30; 20 INJECTION, SOLUTION INTRAVENOUS at 01:06

## 2017-05-30 RX ADMIN — FAMOTIDINE 20 MG: 20 TABLET, FILM COATED ORAL at 11:08

## 2017-05-30 RX ADMIN — NEOSTIGMINE METHYLSULFATE 2.5 MG: 1 INJECTION INTRAVENOUS at 02:00

## 2017-05-30 RX ADMIN — CEFAZOLIN SODIUM 2 G: 2 INJECTION, SOLUTION INTRAVENOUS at 01:15

## 2017-05-30 RX ADMIN — LISINOPRIL 20 MG: 20 TABLET ORAL at 11:07

## 2017-05-30 RX ADMIN — FLUTICASONE PROPIONATE 2 SPRAY: 50 SPRAY, METERED NASAL at 11:08

## 2017-05-30 RX ADMIN — CEFAZOLIN SODIUM 2 G: 2 INJECTION, SOLUTION INTRAVENOUS at 00:50

## 2017-05-30 RX ADMIN — FUROSEMIDE 20 MG: 20 TABLET ORAL at 17:30

## 2017-05-30 RX ADMIN — CEFAZOLIN SODIUM 2 G: 2 INJECTION, SOLUTION INTRAVENOUS at 08:31

## 2017-05-30 RX ADMIN — DEXAMETHASONE SODIUM PHOSPHATE 8 MG: 10 INJECTION INTRAMUSCULAR; INTRAVENOUS at 01:35

## 2017-05-30 RX ADMIN — LIDOCAINE HYDROCHLORIDE 60 MG: 20 INJECTION, SOLUTION INFILTRATION; PERINEURAL at 01:21

## 2017-05-30 RX ADMIN — MIDAZOLAM 0.5 MG: 1 INJECTION INTRAMUSCULAR; INTRAVENOUS at 00:56

## 2017-05-30 RX ADMIN — FENTANYL CITRATE 50 MCG: 50 INJECTION INTRAMUSCULAR; INTRAVENOUS at 02:05

## 2017-05-30 RX ADMIN — POTASSIUM CHLORIDE AND SODIUM CHLORIDE 100 ML/HR: 900; 150 INJECTION, SOLUTION INTRAVENOUS at 08:31

## 2017-05-30 RX ADMIN — POTASSIUM CHLORIDE AND SODIUM CHLORIDE 100 ML/HR: 900; 150 INJECTION, SOLUTION INTRAVENOUS at 18:23

## 2017-05-30 RX ADMIN — DIGOXIN 125 MCG: 125 TABLET ORAL at 08:43

## 2017-05-30 RX ADMIN — METOPROLOL SUCCINATE 50 MG: 50 TABLET, FILM COATED, EXTENDED RELEASE ORAL at 11:08

## 2017-05-30 RX ADMIN — ATORVASTATIN CALCIUM 10 MG: 10 TABLET, FILM COATED ORAL at 21:38

## 2017-05-30 RX ADMIN — FENTANYL CITRATE: 50 INJECTION INTRAMUSCULAR; INTRAVENOUS at 01:00

## 2017-05-30 RX ADMIN — TERAZOSIN HYDROCHLORIDE 2 MG: 2 CAPSULE ORAL at 21:38

## 2017-05-30 RX ADMIN — MIDAZOLAM HYDROCHLORIDE 0.5 MG: 1 INJECTION INTRAMUSCULAR; INTRAVENOUS at 00:56

## 2017-05-30 RX ADMIN — LIDOCAINE HYDROCHLORIDE 40 MG: 20 INJECTION, SOLUTION INFILTRATION; PERINEURAL at 02:05

## 2017-05-30 RX ADMIN — ONDANSETRON 4 MG: 2 INJECTION INTRAMUSCULAR; INTRAVENOUS at 01:45

## 2017-05-30 RX ADMIN — SUGAMMADEX 200 MG: 100 INJECTION, SOLUTION INTRAVENOUS at 02:09

## 2017-05-31 ENCOUNTER — APPOINTMENT (OUTPATIENT)
Dept: CT IMAGING | Facility: HOSPITAL | Age: 77
End: 2017-05-31

## 2017-05-31 PROCEDURE — 99232 SBSQ HOSP IP/OBS MODERATE 35: CPT | Performed by: INTERNAL MEDICINE

## 2017-05-31 PROCEDURE — 99024 POSTOP FOLLOW-UP VISIT: CPT | Performed by: PHYSICIAN ASSISTANT

## 2017-05-31 PROCEDURE — 25010000003 CEFAZOLIN IN DEXTROSE 2-4 GM/100ML-% SOLUTION: Performed by: NEUROLOGICAL SURGERY

## 2017-05-31 PROCEDURE — 97165 OT EVAL LOW COMPLEX 30 MIN: CPT

## 2017-05-31 PROCEDURE — 70450 CT HEAD/BRAIN W/O DYE: CPT

## 2017-05-31 PROCEDURE — 97110 THERAPEUTIC EXERCISES: CPT

## 2017-05-31 RX ORDER — DOCUSATE SODIUM 100 MG/1
100 CAPSULE, LIQUID FILLED ORAL 2 TIMES DAILY PRN
Status: DISCONTINUED | OUTPATIENT
Start: 2017-05-31 | End: 2017-06-01 | Stop reason: HOSPADM

## 2017-05-31 RX ORDER — POLYETHYLENE GLYCOL 3350 17 G/17G
17 POWDER, FOR SOLUTION ORAL DAILY
Status: DISCONTINUED | OUTPATIENT
Start: 2017-05-31 | End: 2017-06-01 | Stop reason: HOSPADM

## 2017-05-31 RX ADMIN — FUROSEMIDE 20 MG: 20 TABLET ORAL at 08:04

## 2017-05-31 RX ADMIN — HYDROCODONE BITARTRATE AND ACETAMINOPHEN 1 TABLET: 5; 325 TABLET ORAL at 07:51

## 2017-05-31 RX ADMIN — DIGOXIN 125 MCG: 125 TABLET ORAL at 08:03

## 2017-05-31 RX ADMIN — DOCUSATE SODIUM 100 MG: 100 CAPSULE, LIQUID FILLED ORAL at 08:04

## 2017-05-31 RX ADMIN — CEFAZOLIN SODIUM 2 G: 2 INJECTION, SOLUTION INTRAVENOUS at 01:50

## 2017-05-31 RX ADMIN — CEFAZOLIN SODIUM 2 G: 2 INJECTION, SOLUTION INTRAVENOUS at 16:48

## 2017-05-31 RX ADMIN — METOPROLOL SUCCINATE 50 MG: 50 TABLET, FILM COATED, EXTENDED RELEASE ORAL at 08:03

## 2017-05-31 RX ADMIN — CEFAZOLIN SODIUM 2 G: 2 INJECTION, SOLUTION INTRAVENOUS at 07:54

## 2017-05-31 RX ADMIN — FUROSEMIDE 20 MG: 20 TABLET ORAL at 19:58

## 2017-05-31 RX ADMIN — HYDROCODONE BITARTRATE AND ACETAMINOPHEN 1 TABLET: 5; 325 TABLET ORAL at 15:19

## 2017-05-31 RX ADMIN — METOPROLOL SUCCINATE 50 MG: 50 TABLET, FILM COATED, EXTENDED RELEASE ORAL at 19:57

## 2017-05-31 RX ADMIN — LISINOPRIL 20 MG: 20 TABLET ORAL at 08:03

## 2017-05-31 RX ADMIN — TERAZOSIN HYDROCHLORIDE 2 MG: 2 CAPSULE ORAL at 19:59

## 2017-05-31 RX ADMIN — FLUTICASONE PROPIONATE 2 SPRAY: 50 SPRAY, METERED NASAL at 08:04

## 2017-05-31 RX ADMIN — ATORVASTATIN CALCIUM 10 MG: 10 TABLET, FILM COATED ORAL at 19:59

## 2017-06-01 ENCOUNTER — APPOINTMENT (OUTPATIENT)
Dept: CT IMAGING | Facility: HOSPITAL | Age: 77
End: 2017-06-01

## 2017-06-01 VITALS
HEART RATE: 103 BPM | SYSTOLIC BLOOD PRESSURE: 126 MMHG | WEIGHT: 248.68 LBS | OXYGEN SATURATION: 93 % | RESPIRATION RATE: 18 BRPM | TEMPERATURE: 98.2 F | BODY MASS INDEX: 35.6 KG/M2 | HEIGHT: 70 IN | DIASTOLIC BLOOD PRESSURE: 58 MMHG

## 2017-06-01 DIAGNOSIS — S06.5XAA SDH (SUBDURAL HEMATOMA) (HCC): Primary | ICD-10-CM

## 2017-06-01 LAB
BASOPHILS # BLD AUTO: 0.04 10*3/MM3 (ref 0–0.2)
BASOPHILS NFR BLD AUTO: 0.2 % (ref 0–1.5)
DEPRECATED RDW RBC AUTO: 64.2 FL (ref 37–54)
EOSINOPHIL # BLD AUTO: 0.66 10*3/MM3 (ref 0–0.7)
EOSINOPHIL NFR BLD AUTO: 4 % (ref 0.3–6.2)
ERYTHROCYTE [DISTWIDTH] IN BLOOD BY AUTOMATED COUNT: 21.4 % (ref 11.5–14.5)
HCT VFR BLD AUTO: 36.5 % (ref 40.4–52.2)
HGB BLD-MCNC: 11.3 G/DL (ref 13.7–17.6)
IMM GRANULOCYTES # BLD: 0.02 10*3/MM3 (ref 0–0.03)
IMM GRANULOCYTES NFR BLD: 0.1 % (ref 0–0.5)
INR PPP: 1.42 (ref 0.9–1.1)
LYMPHOCYTES # BLD AUTO: 1.24 10*3/MM3 (ref 0.9–4.8)
LYMPHOCYTES NFR BLD AUTO: 7.6 % (ref 19.6–45.3)
MCH RBC QN AUTO: 25.4 PG (ref 27–32.7)
MCHC RBC AUTO-ENTMCNC: 31 G/DL (ref 32.6–36.4)
MCV RBC AUTO: 82 FL (ref 79.8–96.2)
MONOCYTES # BLD AUTO: 1.44 10*3/MM3 (ref 0.2–1.2)
MONOCYTES NFR BLD AUTO: 8.8 % (ref 5–12)
NEUTROPHILS # BLD AUTO: 12.99 10*3/MM3 (ref 1.9–8.1)
NEUTROPHILS NFR BLD AUTO: 79.3 % (ref 42.7–76)
PLATELET # BLD AUTO: 250 10*3/MM3 (ref 140–500)
PMV BLD AUTO: 11.5 FL (ref 6–12)
PROTHROMBIN TIME: 16.8 SECONDS (ref 11.7–14.2)
RBC # BLD AUTO: 4.45 10*6/MM3 (ref 4.6–6)
WBC NRBC COR # BLD: 16.39 10*3/MM3 (ref 4.5–10.7)

## 2017-06-01 PROCEDURE — 85025 COMPLETE CBC W/AUTO DIFF WBC: CPT | Performed by: PHYSICIAN ASSISTANT

## 2017-06-01 PROCEDURE — 97110 THERAPEUTIC EXERCISES: CPT

## 2017-06-01 PROCEDURE — 85610 PROTHROMBIN TIME: CPT | Performed by: HOSPITALIST

## 2017-06-01 PROCEDURE — 70450 CT HEAD/BRAIN W/O DYE: CPT

## 2017-06-01 PROCEDURE — 25010000003 CEFAZOLIN IN DEXTROSE 2-4 GM/100ML-% SOLUTION: Performed by: NEUROLOGICAL SURGERY

## 2017-06-01 PROCEDURE — 99233 SBSQ HOSP IP/OBS HIGH 50: CPT | Performed by: INTERNAL MEDICINE

## 2017-06-01 PROCEDURE — 99024 POSTOP FOLLOW-UP VISIT: CPT | Performed by: NURSE PRACTITIONER

## 2017-06-01 RX ADMIN — HYDROCODONE BITARTRATE AND ACETAMINOPHEN 1 TABLET: 5; 325 TABLET ORAL at 04:50

## 2017-06-01 RX ADMIN — CEFAZOLIN SODIUM 2 G: 2 INJECTION, SOLUTION INTRAVENOUS at 00:18

## 2017-06-01 RX ADMIN — FUROSEMIDE 20 MG: 20 TABLET ORAL at 09:43

## 2017-06-01 RX ADMIN — METOPROLOL SUCCINATE 50 MG: 50 TABLET, FILM COATED, EXTENDED RELEASE ORAL at 09:43

## 2017-06-01 RX ADMIN — DOCUSATE SODIUM 100 MG: 100 CAPSULE, LIQUID FILLED ORAL at 09:42

## 2017-06-01 RX ADMIN — LISINOPRIL 20 MG: 20 TABLET ORAL at 09:42

## 2017-06-01 RX ADMIN — POLYETHYLENE GLYCOL 3350 17 G: 17 POWDER, FOR SOLUTION ORAL at 09:43

## 2017-06-01 RX ADMIN — FLUTICASONE PROPIONATE 2 SPRAY: 50 SPRAY, METERED NASAL at 09:44

## 2017-06-01 RX ADMIN — ACETAMINOPHEN 650 MG: 325 TABLET ORAL at 15:58

## 2017-06-01 RX ADMIN — DIGOXIN 125 MCG: 125 TABLET ORAL at 09:43

## 2017-06-01 NOTE — DISCHARGE SUMMARY
PHYSICIAN DISCHARGE SUMMARY                                                                        [facility]    Patient Identification:  Name: Izaiah Ravi  Age: 77 y.o.  Sex: male  :  1940  MRN: [id number]  Primary Care Physician: Luis Tavera MD    Admit date: 2017  Discharge date and time:2017  Discharged Condition: good    Discharge Diagnoses:Principal Problem:    SDH (subdural hematoma)  Active Problems:    Atrial fibrillation    Cardiomyopathy    HTN (hypertension)    Hyperglycemia    Leukocytosis    Hyponatremia    Brain compression     Patient Active Problem List   Diagnosis Code   • Atrial fibrillation I48.91   • Cardiomyopathy I42.9   • SDH (subdural hematoma) I62.00   • HTN (hypertension) I10   • Hyperglycemia R73.9   • Leukocytosis D72.829   • Hyponatremia E87.1   • Brain compression G93.5       PMHX:   Past Medical History:   Diagnosis Date   • Atrial fibrillation    • Congestive cardiomyopathy    • Hyperlipidemia    • Hypertension      PSHX:   Past Surgical History:   Procedure Laterality Date   • SARINA HOLE Left 2017    Procedure: SARINA HOLE left side;  Surgeon: Maximus Greene MD;  Location: [facility];  Service:    • HERNIA REPAIR      Inguinal       Hospital Course: Izaiah Ravi  is a 77 y.o. male who [facility] with [facility].    He presented to the outside hospital with headache for about 4 or 5 days.  It progressively worsened.  The headaches mainly behind his eyes and is described as a dull ache and progressively worsening.  The story starts that he fell on May 5 and hit his head on a concrete wall at [facility].  He was seen in the emergency room at that time and had a CT of his head that was negative for any bleeding.  He was continued on his Coumadin and discharged home.  A couple weeks later he started having headaches that were intermittent.  He  also describes some fatigue with this.  The headache has been intermittent but is become more constant over the last 24 hours and is been constant at this time.  He denies any nausea or vomiting no vision changes no other neurologic symptoms.  At the outside hospital was found to have a subdural hematoma and was transferred to our facility for stabilization and evaluation         The patient was admitted to hospital and taken off anticoagulation.  He was seen by neurosurgery and had drainage of subdural hematoma.  He was doing better after being in the hospital for a few days looked well enough to go home.  He'll continue off anticoagulation until okayed by neurosurgery to restart.  No follow-up visits neurosurgeon in about a week with a repeat CT of the head and also follow-up with his primary care doctor in 1 week for continuing care and cardiology as per usual.    Consults:     Consults     Date and Time Order Name Status Description    5/30/2017 0622 Inpatient Consult to Intensivist      5/28/2017 2241 Inpatient Consult to Cardiology Completed     5/28/2017 2044 Inpatient Consult to Neurosurgery Completed           Results from last 7 days  Lab Units 06/01/17  0513   WBC 10*3/mm3 16.39*   HEMOGLOBIN g/dL 11.3*   HEMATOCRIT % 36.5*   PLATELETS 10*3/mm3 250       Results from last 7 days  Lab Units 05/30/17  0634   SODIUM mmol/L 132*   POTASSIUM mmol/L 4.5   CHLORIDE mmol/L 93*   TOTAL CO2 mmol/L 26.0   BUN mg/dL 20   CREATININE mg/dL 1.04   GLUCOSE mg/dL 147*   CALCIUM mg/dL 8.7     Significant Diagnostic Studies:   Lab Results   Component Value Date    WBC 16.39 (H) 06/01/2017    HGB 11.3 (L) 06/01/2017    HCT 36.5 (L) 06/01/2017     06/01/2017     Lab Results   Component Value Date     (L) 05/30/2017    K 4.5 05/30/2017    CL 93 (L) 05/30/2017    CO2 26.0 05/30/2017    BUN 20 05/30/2017    CREATININE 1.04 05/30/2017    GLUCOSE 147 (H) 05/30/2017     Lab Results   Component Value Date    CALCIUM 8.7  2017     Lab Results   Component Value Date    AST 19 2017    ALT 20 2017    ALKPHOS 56 2017     Lab Results   Component Value Date    INR 1.42 (H) 2017     No results found for: COLORU, CLARITYU, SPECGRAV, PHUR, PROTEINUR, GLUCOSEU, KETONESU, BLOODU, NITRITE, LEUKOCYTESUR, BILIRUBINUR, UROBILINOGEN, RBCUA, WBCUA, BACTERIA  No results found for: TROPONINT, TROPONINI, BNP  No components found for: HGBA1C;2  No components found for: TSH;2  Imaging Results (all)     Procedure Component Value Units Date/Time    CT Head Without Contrast [[id number]] Collected:  17     Updated:  17 0828    Narrative:       CT SCAN OF THE BRAIN WITHOUT CONTRAST AT 9:22 PM     HISTORY: Follow up of mixed density left-sided subdural hematoma  combined with more acute high density subdural hematoma extending along  the tentorium. Mass effect with midline shift.     FINDINGS: The CT scan was performed through the brain without contrast  and compared to the outside CT scan performed 6 1/2 hours ago and  demonstrates the followin. Again noted is a mixed density left subdural hematoma extending along  the left convexity and measuring up to 14 mm in thickness in the region  of the left frontal lobe and 11 mm more posteriorly in the parietal  region. It produces considerable mass effect with effacement of the  cortical sulci throughout the left hemisphere, effacement of the left  lateral ventricle, and midline shift measuring 6 mm. The overall pattern  shows no change from the previous CT scan.  2. There is also a higher density acute component of subdural hematoma  along the posterior falx and extending along the tentorium where it  measures up to 5 mm in thickness and this appearance is unchanged.  3. The patient has underlying mild diffuse atrophy. There is no  hydrocephalus. There is some very minimal mucosal thickening scattered  in the ethmoid air cells. There is prominent sclerosis of the  right  mastoid air cells consistent with changes of chronic mastoiditis.     This report was finalized on 5/28/2017 11:51 PM by Dr. Jadiel Davis MD.       CT Head Without Contrast [[id number]] Collected:  05/29/17 0925     Updated:  05/29/17 1539    Narrative:       HEAD CT WITHOUT CONTRAST     HISTORY: 77-year-old male with a subdural hematoma. Follow-up.     TECHNIQUE: 3 mm images were obtained from the skull base to vertex  without IV contrast. Compared with yesterday's head CT's.     FINDINGS: The mixed density subdural hematoma overlying the left  cerebral hemisphere is slightly more isodense to the brain parenchyma  than on the previous examinations. The transverse diameter is  approximately 1.3 cm. There is mass effect and effacement of the left  cerebral sulci and more prominent effacement of the frontal horn of the  left lateral ventricle. There is slightly more prominent midline shift  to the right which is currently 8 mm and was previously 6 mm. The tiny  subdural along the posterior aspect falx cerebri and along the tentorium  appears largely unchanged. The subdural along the posterior aspect of  the falx cerebri measures 4 mm, stable.     Immediately discussed with the patient's nurse by phone.     This report was finalized on 5/29/2017 3:36 PM by Dr. Leatha Carroll MD.       MRI Brain With & Without Contrast [[id number]] Collected:  05/29/17 1714     Updated:  05/29/17 1745    Narrative:       MRI OF THE BRAIN WITH AND WITHOUT CONTRAST     CLINICAL HISTORY: Patient fell and struck head on concrete 3 weeks ago.  Follow-up subdural hematoma.     TECHNIQUE: Multiple axial T1, T2, diffusion and gradient echo images  were acquired. Axial and coronal and sagittal T1-weighted images were  also obtained after intravenous injection of MultiHance contrast.     COMPARISON: CT scans of the head dated 05/28/2017 and 05/29/2017.     FINDINGS: Again seen is a subdural hematoma overlying the left cerebral  hemisphere  that measures up to 1.8 cm in maximum thickness as measured  on the coronal images in the posterior frontal region. It measures 1.6  cm off the axial images in a similar location. There has been slight,  but definite increase in size over the past 24 hours. On the CT dated  05/28/2017 at 3:00 PM it measured 1.1 cm in the left frontal region.  There is significant mass effect with effacement of cortical sulci and  left to right shift of the midline structures by approximately 1.2 cm.  Is also increased significantly. On the 05/20/2017 CT there is only 4 to  5 mm of midline shift. Blood within the subdural hematoma shows low  signal intensity on the T2-weighted images, and is only slightly higher  in signal intensity than CSF on the T1-weighted images. These signal  characteristics are produced by the deoxyhemoglobin within nonclotted  red blood cells indicating a subacute hematoma. There is no evidence of  acute hemorrhage. The increase in size of the hematoma is most likely  due to osmotic effects. There is a second thin subdural hematoma  overlying the anterior aspect of the right frontal lobe that measures up  to 5 mm in thickness. This shows high signal intensity on the FLAIR  images indicating a late subacute phase hematoma. No foci of restricted  diffusion are identified in the brain or brainstem. There is no evidence  of acute infarct. No chronic infarcts are identified. Normal flow voids  are evident in the major vascular structures.       Impression:       Moderately large subdural hematoma overlying the left  cerebral hemisphere showing significant increase in size since a CT  performed yesterday. There is significant mass effect with left to right  shift of the midline structures by 1.2 cm that has also increased  significantly. A thin late subacute phase subdural hematoma is also  identified overlying the right frontal lobe anteriorly. There is no  evidence of acute infarct.     The signs were called to  Dr. Greene on 05/29/2017 at 5:40 PM     This report was finalized on 5/29/2017 5:42 PM by Dr. Abe Otto MD.       CT Head Without Contrast [[id number]] Collected:  05/30/17 0557     Updated:  05/30/17 0605    Narrative:       CT SCAN OF THE BRAIN WITHOUT CONTRAST AT 5:37 AM     HISTORY: Recent surgery for drainage of large left subdural hematoma.     The CT scan was performed through the brain without contrast and  compared to yesterday's preoperative examination. There is a high left  frontal mera hole with associated subdural drain in place and there has  been marked reduction in size of the previous large left subdural  hematoma that measured up to 1.6 cm. There remains a small intermediate  density subdural hematoma extending along the convexity measuring 4 mm  as well as some pneumocephalus layering anteriorly. There is  corresponding decreased mass effect on today's exam with less effacement  of the cortical sulci. The midline shift now measures 1 mm compared to 8  mm on the previous exam.     There remains some high density subdural hematoma extending along the  tentorium that measures 3 mm in thickness compared to 4 mm on the  previous exam.     This report was finalized on 5/30/2017 6:02 AM by Dr. Jadiel Davis MD.       CT Head Without Contrast [[id number]] Collected:  05/31/17 0558     Updated:  05/31/17 0605    Narrative:       CT SCAN OF THE BRAIN WITHOUT CONTRAST AT 5:05 AM     HISTORY: Follow-up of left-sided subdural hematoma post surgical  drainage.     The CT scan was performed through the brain without contrast and  compared to yesterday's exam and again shows a high left frontal mera  hole with surgical drain in place. There is a very small residual  intermediate density subdural hematoma extending along the convexity  posteriorly measuring 3 mm in thickness compared to 4 mm on yesterday's  exam. There is also less pneumocephalus layering anteriorly. There  remains a small amount of high  density subdural hematoma along the  posterior falx and extending along the leaflets of the tentorium that  measures up to 3 mm in thickness without change.     The amount of left-sided mass effect shows considerable improvement from  yesterday's exam with less effacement of the cortical sulci. There is no  midline shift.     This report was finalized on 5/31/2017 6:02 AM by Dr. Jadiel Davis MD.       CT Head Without Contrast [[id number]] Collected:  06/01/17 0453     Updated:  06/01/17 0454    Narrative:       CT SCAN OF THE BRAIN WITHOUT CONTRAST.     TECHNIQUE: Multiple axial images of the brain were obtained from the  vertex to the base of the brain.     HISTORY:  Intracranial hemorrhage follow-up.     COMPARISON:  05/31/2017.     FINDINGS:   Midline structures are within normal limits, there is no hydrocephalus.  Gray-white matter differentiation is maintained. Postsurgical changes of  the left frontal bone are stable. Minimal fluid, no significant. There  is a very thin layer of thickness subdural hemorrhage layering the left  frontal lobe, measuring up to 0.2-0.3 cm in thickness. Overall no  significant change from prior study     Orbits are within normal limits. No significant mucosal disease of the  para-nasal sinuses. Fluid in the right mastoid air cells.             Impression:       Very thin layer of 0.2-0.3 cm subdural blood layers the left cerebral  hemisphere. Overall no significant change. No midline shift.                     Lab Results (last 7 days)     Procedure Component Value Units Date/Time    CBC (No Diff) [[id number]]  (Abnormal) Collected:  05/28/17 2104    Specimen:  Blood Updated:  05/28/17 2116     WBC 17.96 (H) 10*3/mm3      RBC 5.08 10*6/mm3      Hemoglobin 13.0 (L) g/dL      Hematocrit 39.9 (L) %      MCV 78.5 (L) fL      MCH 25.6 (L) pg      MCHC 32.6 g/dL      RDW 21.2 (H) %      RDW-SD 60.3 (H) fl      MPV 10.7 fL      Platelets 298 10*3/mm3     Protime-INR [[id number]]  (Abnormal)  Collected:  05/28/17 2104    Specimen:  Blood Updated:  05/28/17 2127     Protime 19.3 (H) Seconds      INR 1.69 (H)    Basic Metabolic Panel [[id number]]  (Abnormal) Collected:  05/28/17 2104    Specimen:  Blood Updated:  05/28/17 2143     Glucose 163 (H) mg/dL      BUN 23 mg/dL      Creatinine 1.37 (H) mg/dL      Sodium 130 (L) mmol/L      Potassium 4.5 mmol/L      Chloride 89 (L) mmol/L      CO2 23.9 mmol/L      Calcium 9.8 mg/dL      eGFR Non African Amer 50 (L) mL/min/1.73      BUN/Creatinine Ratio 16.8     Anion Gap 17.1 mmol/L     Narrative:       The MDRD GFR formula is only valid for adults with stable renal function between ages 18 and 70.    CBC (No Diff) [[id number]]  (Abnormal) Collected:  05/29/17 0508    Specimen:  Blood Updated:  05/29/17 0524     WBC 13.64 (H) 10*3/mm3      RBC 4.77 10*6/mm3      Hemoglobin 12.1 (L) g/dL      Hematocrit 38.3 (L) %      MCV 80.3 fL      MCH 25.4 (L) pg      MCHC 31.6 (L) g/dL      RDW 21.2 (H) %      RDW-SD 61.3 (H) fl      MPV 10.7 fL      Platelets 274 10*3/mm3     Basic Metabolic Panel [[id number]]  (Abnormal) Collected:  05/29/17 0508    Specimen:  Blood Updated:  05/29/17 0550     Glucose 176 (H) mg/dL      BUN 22 mg/dL      Creatinine 1.12 mg/dL      Sodium 132 (L) mmol/L      Potassium 4.1 mmol/L      Chloride 94 (L) mmol/L      CO2 21.1 (L) mmol/L      Calcium 9.4 mg/dL      eGFR Non African Amer 64 mL/min/1.73      BUN/Creatinine Ratio 19.6     Anion Gap 16.9 mmol/L     Narrative:       The MDRD GFR formula is only valid for adults with stable renal function between ages 18 and 70.    Magnesium [[id number]]  (Abnormal) Collected:  05/29/17 0508    Specimen:  Blood Updated:  05/29/17 0550     Magnesium 2.5 (H) mg/dL     Phosphorus [[id number]]  (Normal) Collected:  05/29/17 0508    Specimen:  Blood Updated:  05/29/17 0550     Phosphorus 3.4 mg/dL     Protime-INR [[id number]]  (Abnormal) Collected:  05/29/17 0842    Specimen:  Blood Updated:  05/29/17 0906      Protime 17.1 (H) Seconds      INR 1.45 (H)    POC Glucose Fingerstick [[id number]]  (Abnormal) Collected:  05/30/17 0559    Specimen:  Blood Updated:  05/30/17 0603     Glucose 137 (H) mg/dL     Narrative:       Meter: [id number] : [id number] [address]    Protime-INR [[id number]]  (Abnormal) Collected:  05/30/17 0634    Specimen:  Blood Updated:  05/30/17 0706     Protime 16.8 (H) Seconds      INR 1.42 (H)    aPTT [[id number]]  (Normal) Collected:  05/30/17 0634    Specimen:  Blood Updated:  05/30/17 0706     PTT 34.9 seconds     CBC & Differential [[id number]] Collected:  05/30/17 0634    Specimen:  Blood Updated:  05/30/17 0714    Narrative:       The following orders were created for panel order CBC & Differential.  Procedure                               Abnormality         Status                     ---------                               -----------         ------                     CBC Auto Differential[[id number]]        Abnormal            Final result                 Please view results for these tests on the individual orders.    CBC Auto Differential [[id number]]  (Abnormal) Collected:  05/30/17 0634    Specimen:  Blood Updated:  05/30/17 0714     WBC 16.26 (H) 10*3/mm3      RBC 4.66 10*6/mm3      Hemoglobin 11.7 (L) g/dL      Hematocrit 37.4 (L) %      MCV 80.3 fL      MCH 25.1 (L) pg      MCHC 31.3 (L) g/dL      RDW 21.5 (H) %      RDW-SD 63.2 (H) fl      MPV 10.7 fL      Platelets 241 10*3/mm3      Neutrophil % 91.9 (H) %      Lymphocyte % 4.6 (L) %      Monocyte % 3.2 (L) %      Eosinophil % 0.0 (L) %      Basophil % 0.1 %      Immature Grans % 0.2 %      Neutrophils, Absolute 14.95 (H) 10*3/mm3      Lymphocytes, Absolute 0.75 (L) 10*3/mm3      Monocytes, Absolute 0.52 10*3/mm3      Eosinophils, Absolute 0.00 10*3/mm3      Basophils, Absolute 0.01 10*3/mm3      Immature Grans, Absolute 0.03 10*3/mm3     Comprehensive Metabolic Panel [[id number]]  (Abnormal) Collected:  05/30/17 0634    Specimen:   Blood Updated:  05/30/17 0721     Glucose 147 (H) mg/dL      BUN 20 mg/dL      Creatinine 1.04 mg/dL      Sodium 132 (L) mmol/L      Potassium 4.5 mmol/L      Chloride 93 (L) mmol/L      CO2 26.0 mmol/L      Calcium 8.7 mg/dL      Total Protein 7.1 g/dL      Albumin 3.80 g/dL      ALT (SGPT) 20 U/L      AST (SGOT) 19 U/L      Alkaline Phosphatase 56 U/L      Total Bilirubin 0.5 mg/dL      eGFR Non African Amer 69 mL/min/1.73      Globulin 3.3 gm/dL      A/G Ratio 1.2 g/dL      BUN/Creatinine Ratio 19.2     Anion Gap 13.0 mmol/L     Narrative:       The MDRD GFR formula is only valid for adults with stable renal function between ages 18 and 70.    Hemoglobin A1c [[id number]]  (Abnormal) Collected:  05/30/17 0634    Specimen:  Blood Updated:  05/30/17 0750     Hemoglobin A1C 5.98 (H) %     Narrative:       Hemoglobin A1C Ranges:    Increased Risk for Diabetes  5.7% to 6.4%  Diabetes                     >= 6.5%  Diabetic Goal                < 7.0%    CBC & Differential [[id number]] Collected:  06/01/17 0513    Specimen:  Blood Updated:  06/01/17 0615    Narrative:       The following orders were created for panel order CBC & Differential.  Procedure                               Abnormality         Status                     ---------                               -----------         ------                     CBC Auto Differential[[id number]]        Abnormal            Final result                 Please view results for these tests on the individual orders.    CBC Auto Differential [[id number]]  (Abnormal) Collected:  06/01/17 0513    Specimen:  Blood Updated:  06/01/17 0615     WBC 16.39 (H) 10*3/mm3      RBC 4.45 (L) 10*6/mm3      Hemoglobin 11.3 (L) g/dL      Hematocrit 36.5 (L) %      MCV 82.0 fL      MCH 25.4 (L) pg      MCHC 31.0 (L) g/dL      RDW 21.4 (H) %      RDW-SD 64.2 (H) fl      MPV 11.5 fL      Platelets 250 10*3/mm3      Neutrophil % 79.3 (H) %      Lymphocyte % 7.6 (L) %      Monocyte % 8.8 %       "Eosinophil % 4.0 %      Basophil % 0.2 %      Immature Grans % 0.1 %      Neutrophils, Absolute 12.99 (H) 10*3/mm3      Lymphocytes, Absolute 1.24 10*3/mm3      Monocytes, Absolute 1.44 (H) 10*3/mm3      Eosinophils, Absolute 0.66 10*3/mm3      Basophils, Absolute 0.04 10*3/mm3      Immature Grans, Absolute 0.02 10*3/mm3     Protime-INR [[id number]]  (Abnormal) Collected:  06/01/17 0513    Specimen:  Blood Updated:  06/01/17 0625     Protime 16.8 (H) Seconds      INR 1.42 (H)        /58 (BP Location: Right arm, Patient Position: Sitting)  Pulse 103  Temp 98.2 °F (36.8 °C) (Oral)   Resp 18  Ht 70\" (177.8 cm)  Wt 248 lb 10.9 oz (113 kg)  SpO2 93%  BMI 35.68 kg/m2    Discharge Exam:  General Appearance:    Alert, cooperative, no distress                          Head:    Normocephalic, without obvious abnormality, atraumatic                          Eyes:                            Throat:   Lips, tongue, gums normal                          Neck:   Supple, symmetrical, trachea midline, no JVD                        Lungs:     Clear to auscultation bilaterally, respirations unlabored                Chest Wall:    No tenderness or deformity                        Heart:    Regular rate and rhythm, S1 and S2 normal, no murmur,no  Rub  or gallop                  Abdomen:     Soft, non-tender, bowel sounds active, no masses, no organomegaly                  Extremities:   Extremities normal, atraumatic, no cyanosis or edema                             Skin:   Skin is warm and dry,  no rashes or palpable lesions                  Neurologic:   no focal deficits noted     Disposition:  Home    Patient Instructions:    Izaiah Ravi   Home Medication Instructions MICKI:[id number]    Printed on:06/01/17 5894   Medication Information                      benazepril (LOTENSIN) 20 MG tablet  Take 1 tablet by mouth daily.             CRANBERRY PO  Take 4,200 mg by mouth Daily.             digoxin (LANOXIN) 125 MCG " tablet  Take  by mouth every night at bedtime.             diphenhydrAMINE-acetaminophen (TYLENOL PM)  MG tablet per tablet  Take 1 tablet by mouth At Night As Needed for sleep.             doxazosin (CARDURA) 2 MG tablet  Take 1 tablet by mouth daily.             fluticasone (FLONASE) 50 MCG/ACT nasal spray  2 sprays into each nostril Daily.             FOLIC ACID PO  Take 400 mcg by mouth Daily.             furosemide (LASIX) 20 MG tablet  Take 1 tablet by mouth 2 (Two) Times a Day.             metoprolol succinate XL (TOPROL-XL) 50 MG 24 hr tablet  Take 50 mg by mouth 2 (Two) Times a Day.             metoprolol tartrate (LOPRESSOR) 50 MG tablet  Take 1 tablet by mouth every 12 (twelve) hours.             Multiple Vitamin (MULTIVITAMIN) capsule  Take  by mouth Daily.             Omega-3 Fatty Acids (FISH OIL) 1000 MG capsule capsule  Take 2,000 mg by mouth 2 (Two) Times a Day With Meals.             simvastatin (ZOCOR) 20 MG tablet  Take 1 tablet by mouth daily.               Future Appointments  Date Time Provider Department Center   6/8/2017 7:00 AM [facility] CT 3 [facility]   6/8/2017 9:00 AM LINDA Holliday [facility] None   2/22/2018 12:45 PM Zana Mancini MD [facility] None     Follow-up Information     Follow up with Maximus Greene MD .    Specialty:  Neurosurgery    Contact information:    [address]  [place] [address]  [phone]          Follow up with Luis Tavera MD Follow up in 1 week(s).    Specialty:  Family Medicine    Contact information:    [address] [facility] [place] [address] [phone]          Discharge Order     Start     Ordered    06/01/17 1540  Discharge patient  Once     Expected Discharge Date:  06/01/17    Discharge Disposition:  Home or Self Care        06/01/17 1539    06/01/17 1450  Discharge patient  Once     Comments:  [facility]  [address], [place] [address]  Phone:  [phone]  Fax:  [phone]      Maximus HOOD  RADHA Greene., F.A.C.S.              Sameer Mary M.D., F.A.C.S    CRANIOTOMY POST-OPERATIVE INSTRUCTIONS      You should have a post-operative appointment scheduled for 2 to 2 ½ weeks after surgery with our Physician Assistant or Nurse Practitioner. If you do not, please call the office when you return home from the hospital to schedule this appointment.     You may ride home from the hospital in a car. You may not drive a vehicle prior to your first post-operative appointment.    Remove the dressing(s) over the wound(s) when you arrive home unless otherwise indicated. If you are instructed to keep your incision(s) covered, you need to use a loose dressing. Do not dress the incision too tight.     You may wash your hair with a mild shampoo; however, you cannot let the force of the water touch the incision(s) directly. Pat dry gently.     You must clean your incision(s) twice a day with hydrogen peroxide with a cotton ball unless otherwise instructed.     If you experience any abnormal swelling, redness, drainage at the incision site or fever or chills you must call our office immediately. Please call the office if you experience a severe headache, nausea, or vomiting.     Activities are as tolerated. No straining or lifting greater than 15 pounds and no bending over.    You will leave the hospital with medications for pain and possibly oral antibiotics, steroids and anti-convulsants if indicated. These medications must be taken as prescribed only. If a refill of your pain medication is required, due to changes in federal law, in order to have this medication refilled you must contact the office four days prior to the due date and make arrangements to pick-up the prescription in our office. The prescription refill cannot be called in to the pharmacy. Your prescription will be ready for pick-up the day the refill is due.     Thank you.   Expected Discharge Date:  06/01/17    Discharge Disposition:  Home or Self  Care        06/01/17 1450          Total time spent discharging patient including evaluation,post hospitalization follow up,  medication and post hospitalization instructions and education total time exceeds 30 minutes.    Signed:  Epifanio Cruz MD  6/1/2017  3:39 PM

## 2017-06-01 NOTE — THERAPY DISCHARGE NOTE
Acute Care - Physical Therapy Treatment Note/Discharge  [facility]     Patient Name: Izaiah Ravi  : 1940  MRN: [id number]  Today's Date: 2017        Referring Physician: yasmine    Admit Date: 2017    Visit Dx:    ICD-10-CM ICD-9-CM   1. Impaired functional mobility, balance, gait, and endurance Z74.09 V49.89   2. Subdural hematoma I62.00 432.1     Patient Active Problem List   Diagnosis   • Atrial fibrillation   • Cardiomyopathy   • SDH (subdural hematoma)   • HTN (hypertension)   • Hyperglycemia   • Leukocytosis   • Hyponatremia   • Brain compression       Physical Therapy Education     Title: PT OT SLP Therapies (Active)     Topic: Physical Therapy (Resolved)     Point: Mobility training (Resolved)    Learning Progress Summary    Learner Readiness Method Response Comment Documented by Status   Patient Acceptance E VU  SKYLA 17 0940 Done    Acceptance E VU  KA 17 1407 Done    Acceptance E VU safety with ambulation CK 17 1434 Done               Point: Home exercise program (Resolved)    Learning Progress Summary    Learner Readiness Method Response Comment Documented by Status   Patient Acceptance E VU  SKYLA 17 0940 Done               Point: Body mechanics (Resolved)    Learning Progress Summary    Learner Readiness Method Response Comment Documented by Status   Patient Acceptance E VU  SKYLA 17 0940 Done    Acceptance E VU  KA 17 1407 Done               Point: Precautions (Resolved)    Learning Progress Summary    Learner Readiness Method Response Comment Documented by Status   Patient Acceptance E VU  SKYLA 17 0940 Done    Acceptance E VU  KA 17 1407 Done                      User Key     Initials Effective Dates Name Provider Type Discipline    CK 04/24/15 -  Jayme March, PT Physical Therapist PT    SKYLA 04/24/15 -  Kunal Arevalo PTA Physical Therapy Assistant PT    JOANA 17 -  Xiomara Herr PT Student PT Student PT                    IP PT Goals        06/01/17 0940 05/29/17 1435       Bed Mobility PT LTG    Bed Mobility PT LTG, Date Established  05/29/17  -CK     Bed Mobility PT LTG, Time to Achieve  5 days  -CK     Bed Mobility PT LTG, Activity Type  all bed mobility  -CK     Bed Mobility PT LTG, [place] Level  independent  -CK     Bed Mobility PT LTG, Date Goal Reviewed 06/01/17  -SKYLA      Bed Mobility PT LTG, Outcome goal met  -SKYLA      Transfer Training PT LTG    Transfer Training PT LTG, Date Established  05/29/17  -CK     Transfer Training PT LTG, Time to Achieve  5 days  -CK     Transfer Training PT LTG, Activity Type  all transfers  -CK     Transfer Training PT LTG, [place] Level  independent  -CK     Transfer Training PT  LTG, Date Goal Reviewed 06/01/17  -SKYLA      Transfer Training PT LTG, Outcome goal met  -SKYLA      Gait Training PT LTG    Gait Training Goal PT LTG, Date Established  05/29/17  -CK     Gait Training Goal PT LTG, Time to Achieve  5 days  -CK     Gait Training Goal PT LTG, [place] Level  supervision required  -CK     Gait Training Goal PT LTG, Distance to Achieve  100  -CK     Gait Training Goal PT LTG, Date Goal Reviewed 06/01/17  -SKYLA      Gait Training Goal PT LTG, Outcome goal met  -SKYLA        User Key  (r) = Recorded By, (t) = Taken By, (c) = Cosigned By    Initials Name Provider Type    DANIA March, PT Physical Therapist    SKYLA Arevalo, TYRON Physical Therapy Assistant              Adult Rehabilitation Note       06/01/17 0920 05/31/17 1405       Rehab Assessment/Intervention    Discipline physical therapy assistant  -SKYLA physical therapist   student  -PC,KA,PC2     Document Type therapy note (daily note)  -SKYLA therapy note (daily note)  -PC,KA,PC2     Subjective Information agree to therapy  -SKYLA agree to therapy  -PC,KA,PC2     Patient Effort, Rehab Treatment good  -SKYLA good  -PC,KA,PC2     Symptoms Noted During/After Treatment  none  -PC,KA,PC2     Precautions/Limitations fall precautions  -SKYLA fall precautions   -PC,KA,PC2     Recorded by [SKYLA] Kunal Arevalo PTA [PC,KA,PC2] Alejandra Carolina, PT (r) Xiomara Herr PT Student (t) Alejandra Carolina PT (c)     Pain Assessment    Pain Assessment No/denies pain  -SKYLA No/denies pain   denies headache  -PC,KA,PC2     Recorded by [SKYLA] Kunal Arevalo PTA [PC,KA,PC2] Alejandra Carolina, PT (r) Xiomara Herr, PT Student (t) Alejandra Carolina, PT (c)     Cognitive Assessment/Intervention    Current Cognitive/Communication Assessment functional  -SKYLA functional  -PC,KA,PC2     Orientation Status oriented x 4  -SKYLA oriented x 4  -PC,KA,PC2     Follows Commands/Answers Questions 100% of the time  -SKYLA 100% of the time  -PC,KA,PC2     Personal Safety WNL/WFL  -SKYLA WNL/WFL  -PC,KA,PC2     Personal Safety Interventions fall prevention program maintained;gait belt;nonskid shoes/slippers when out of bed  -SKYLA fall prevention program maintained;nonskid shoes/slippers when out of bed  -PC,KA,PC2     Recorded by [SKYLA] Kunal Arevalo PTA [PC,KA,PC2] Alejandra Carolina, PT (r) Xiomara Herr PT Student (t) Alejandra Carolina PT (c)     Bed Mobility, Assessment/Treatment    Bed Mob, Supine to Sit, [place] independent  -SKYLA not tested  -PC,KA,PC2     Bed Mob, Sit to Supine, [place] not tested  -SKYLA not tested  -PC,KA,PC2     Bed Mobility, Safety Issues decreased use of legs for bridging/pushing  -SKYLA      Bed Mobility, Comment  sitting in chair  -PC,KA,PC2     Recorded by [SKYLA] Kunal Arevalo PTA [PC,KA,PC2] Alejandra Carolina, PT (r) Xiomara Herr, PT Student (t) Alejandra Carolina PT (c)     Transfer Assessment/Treatment    Transfers, Sit-Stand [place] independent  -SKYLA contact guard assist  -PC,KA,PC2     Transfers, Stand-Sit [place] independent  -SKYLA contact guard assist  -PC,KA,PC2     Transfer, Impairments strength decreased;impaired balance  -SKYLA      Recorded by [SKYLA] Kunal Arevalo PTA [PC,KA,PC2] Alejandra Carolina, PT (r) Xiomara Nemesio, PT Student (t) Alejandra Carolina, PT (c)     Gait Assessment/Treatment    Gait,  [place] Level supervision required  -SKYLA contact guard assist  -PC,KA,PC2     Gait, Distance (Feet) 400  -SKYLA 150  -PC,KA,PC2     Gait, Gait Deviations step length decreased;weight-shifting ability decreased  -SKYLA charleen decreased;step length decreased  -PC,KA,PC2     Gait, Impairments strength decreased;impaired balance  -SKYLA      Recorded by [SKYLA] Kunal Arevalo PTA [PC,KA,PC2] Alejandra Carolina, PT (r) Xiomara Herr PT Student (t) Alejandra Carolina PT (c)     Therapy Exercises    Bilateral Lower Extremities AROM:;10 reps;LAQ;hip flexion  -SKYLA      Recorded by [SKYLA] Kunal Arevalo PTA      Positioning and Restraints    Pre-Treatment Position in bed  -SKYLA sitting in chair/recliner  -PC,KA,PC2     Post Treatment Position chair  -SKYLA chair  -PC,KA,PC2     In Chair sitting;call light within reach;encouraged to call for assist;with family/caregiver;with nsg  -SKYLA sitting;call light within reach;encouraged to call for assist;with family/caregiver;with nsg  -YOMI,KA,PC2     Recorded by [SKYLA] Kunal Arevalo PTA [PC,KA,PC2] Alejandra Carolina, ELINA (r) Xiomara Herr PT Student (t) Alejandra Carolina PT (c)       User Key  (r) = Recorded By, (t) = Taken By, (c) = Cosigned By    Initials Name Effective Dates    YOMI Carolina, ELINA 12/01/15 -     SKYLA Arevalo PTA 04/24/15 -     JOANA Herr PT Student 05/08/17 -           PT Recommendation and Plan  PT Frequency: daily  Plan of Care Review  Plan Of Care Reviewed With: patient  Outcome Summary/Follow up Plan: Pt has now met all PT goals w/ no concerns noted.  Pt and wife advised to use SPC at home if fatigued or balance is impaired.            Outcome Measures       06/01/17 0900 05/31/17 1400 05/31/17 1000    How much help from another person do you currently need...    Turning from your back to your side while in flat bed without using bedrails? 4  -SKYLA 4  -PC (r) KA (t) PC (c)     Moving from lying on back to sitting on the side of a flat bed without bedrails? 4  -SKYLA 4  -PC (r) KA (t) PC  (c)     Moving to and from a bed to a chair (including a wheelchair)? 4  -SKYLA 4  -PC (r) KA (t) PC (c)     Standing up from a chair using your arms (e.g., wheelchair, bedside chair)? 4  -SKYLA 3  -PC (r) KA (t) PC (c)     Climbing 3-5 steps with a railing? 3  -SKYLA 3  -PC (r) KA (t) PC (c)     To walk in hospital room? 3  -SKYLA 3  -PC (r) KA (t) PC (c)     AM-PAC 6 Clicks Score 22  -SKYLA 21  -PC (r) KA (t)     How much help from another is currently needed...    Putting on and taking off regular lower body clothing?   2  -CW    Bathing (including washing, rinsing, and drying)   2  -CW    Toileting (which includes using toilet bed pan or urinal)   3  -CW    Putting on and taking off regular upper body clothing   3  -CW    Taking care of personal grooming (such as brushing teeth)   3  -CW    Eating meals   3  -CW    Score   16  -CW    Modified [place] Scale    Modified [place] Scale   4 - Moderately severe disability.  Unable to walk without assistance, and unable to attend to own bodily needs without assistance.  -CW    Functional Assessment    Outcome Measure Options AM-PAC 6 Clicks Basic Mobility (PT)  -SKYLA AM-PAC 6 Clicks Basic Mobility (PT)  -PC (r) KA (t) PC (c) AM-PAC 6 Clicks Daily Activity (OT);Modified [place]  -CW      05/29/17 1400          How much help from another person do you currently need...    Turning from your back to your side while in flat bed without using bedrails? 4  -CK      Moving from lying on back to sitting on the side of a flat bed without bedrails? 3  -CK      Moving to and from a bed to a chair (including a wheelchair)? 3  -CK      Standing up from a chair using your arms (e.g., wheelchair, bedside chair)? 3  -CK      Climbing 3-5 steps with a railing? 3  -CK      To walk in hospital room? 3  -CK      AM-PAC 6 Clicks Score 19  -CK      Functional Assessment    Outcome Measure Options AM-PAC 6 Clicks Basic Mobility (PT)  -CK        User Key  (r) = Recorded By, (t) = Taken By, (c) = Cosigned By     Initials Name Provider Type    CK Jayme March, PT Physical Therapist    CW Mary Ann Pedraza, OTR Occupational Therapist    PC Alejandra Carolina, PT Physical Therapist    SKYLA Arevalo PTA Physical Therapy Assistant    JOANA Herr, PT Student PT Student           Time Calculation:         PT Charges       06/01/17 0940          Time Calculation    Start Time 0920  -SKYLA      Stop Time 0936  -SKYLA      Time Calculation (min) 16 min  -SKYLA      PT Received On 06/01/17  -SKYLA      PT - Next Appointment 06/02/17  -SKYLA        User Key  (r) = Recorded By, (t) = Taken By, (c) = Cosigned By    Initials Name Provider Type    SKYLA Arevalo PTA Physical Therapy Assistant          Therapy Charges for Today     Code Description Service Date Service Provider Modifiers Qty    04963333127 HC PT THER PROC EA 15 MIN 6/1/2017 Kunal Arevalo PTA GP 1          PT G-Codes  Outcome Measure Options: AM-PAC 6 Clicks Basic Mobility (PT)    PT Discharge Summary  Reason for Discharge: All goals achieved  Outcomes Achieved: Able to achieve all goals within established timeline  Discharge Destination: Home with assist    Kunal Arevalo PTA  6/1/2017

## 2017-06-01 NOTE — PROGRESS NOTES
"   LOS: 4 days   Patient Care Team:  Luis Tavera MD as PCP - General    Chief Complaint:     Post op visit    Subjective     History of Present Illness    Subjective:  Symptoms:  Stable.  No shortness of breath, chest pain or headache.  (Up in chair. No complaints. ).    Diet:  Adequate intake.  No nausea or vomiting.    Activity level: Normal.    Pain:  He reports no pain.        History taken from: patient chart    Objective     Vital Signs  Temp:  [98 °F (36.7 °C)-98.7 °F (37.1 °C)] 98 °F (36.7 °C)  Heart Rate:  [] 99  Resp:  [15-18] 18  BP: (117-131)/(62-72) 122/66    Objective:  General Appearance:  Comfortable.    Vital signs: (most recent): Blood pressure 122/66, pulse 99, temperature 98 °F (36.7 °C), temperature source Oral, resp. rate 18, height 70\" (177.8 cm), weight 248 lb 10.9 oz (113 kg), SpO2 93 %.  Vital signs are normal.  No fever.    Output: Producing urine.    Lungs:  Normal effort.    Heart: Normal rate.    Abdomen: Abdomen is soft.    Neurological: Patient is alert and oriented to person, place and time.  GCS score is 15.  (No focal deficits. ).    Pupils:  Pupils are equal, round, and reactive to light.    Skin:  Warm and dry.  (L craniotomy incision well approximated. Staples intact. No redness, swelling or drainage. )        Results Review:     I reviewed the patient's new clinical results.  I reviewed the patient's new imaging results and agree with the interpretation.  Discussed with Dr. Greene     Results for JULIA MCGOWAN (MRN [id number]) as of 6/1/2017 13:37   Ref. Range 6/1/2017 05:13   WBC Latest Ref Range: 4.50 - 10.70 10*3/mm3 16.39 (H)   RBC Latest Ref Range: 4.60 - 6.00 10*6/mm3 4.45 (L)   Hemoglobin Latest Ref Range: 13.7 - 17.6 g/dL 11.3 (L)   Hematocrit Latest Ref Range: 40.4 - 52.2 % 36.5 (L)   RDW Latest Ref Range: 11.5 - 14.5 % 21.4 (H)   MCV Latest Ref Range: 79.8 - 96.2 fL 82.0   MCH Latest Ref Range: 27.0 - 32.7 pg 25.4 (L)   MCHC Latest Ref Range: 32.6 - 36.4 " g/dL 31.0 (L)   MPV Latest Ref Range: 6.0 - 12.0 fL 11.5   Platelets Latest Ref Range: 140 - 500 10*3/mm3 250       CT SCAN OF THE BRAIN WITHOUT CONTRAST 6/1/17    Thin layer of residual subdural blood noted in the left cerebral hemisphere.  No evidence of midline shift.  No significant interval change.      Medication Review:     Will add po Levaquin per Dr. Greene     Assessment/Plan     Principal Problem:    SDH (subdural hematoma)  Active Problems:    Atrial fibrillation    Cardiomyopathy    HTN (hypertension)    Hyperglycemia    Leukocytosis    Hyponatremia    Brain compression      Assessment & Plan     POD 3 left frontal mera hole for SDH  Post op leukocytosis  Hx of Afib      Drain out  Cleared for discharge anytime from Dr. Greene' standpoint. We will let admitting doctor know.  Will start po Levaquin 500 mg daily x 5 days  Patient will be seen in office June 8th at 9 am; he will need to report to the radiology department in the hospital same day at 6:45 am for head CT.       Darlene Blanton, LINDA  06/01/17  1:37 PM

## 2017-06-01 NOTE — DISCHARGE INSTRUCTIONS
[facility]rological [facility]  [address], [address]  [place] [address]  Phone:  [phone]  Fax:  [phone]      Maximus Greene M.D., F.A.C.S.              Sameer Mary M.D., F.A.C.S    CRANIOTOMY POST-OPERATIVE INSTRUCTIONS      1. You should have a post-operative appointment scheduled for 2 to 2 ½ weeks after surgery with our Physician Assistant or Nurse Practitioner. If you do not, please call the office when you return home from the hospital to schedule this appointment.     2. You may ride home from the hospital in a car. You may not drive a vehicle prior to your first post-operative appointment.    3. Remove the dressing(s) over the wound(s) when you arrive home unless otherwise indicated. If you are instructed to keep your incision(s) covered, you need to use a loose dressing. Do not dress the incision too tight.     4. You may wash your hair with a mild shampoo; however, you cannot let the force of the water tough the incision(s) directly. Pat and dry gently.     5. You must clean your incision(s) twice a day with hydrogen peroxide with a cotton ball unless otherwise instructed.     6. If you experience any abnormal swelling, redness, drainage at the incision site or fever or chills you must call our office immediately. Please call the office if you experience a severe headache, nausea, or vomiting.     7. Activities are as tolerated. No straining or lifting greater than 15 pounds and no bending over.    8. You will leave the hospital with medications for pain and possibly oral antibiotics, steroids and anti-convulsants if indicated. These medications must be taken as prescribed only. If a refill of your pain medication is required, due to changes in federal law, in order to have this medication refilled you must contact the office four days prior to the due date and make arrangements to pick-up the prescription in our office. The prescription refill cannot be called in to the pharmacy.  Your prescription will be ready for pick-up the day the refill is due.     Thank you.

## 2017-06-01 NOTE — PROGRESS NOTES
"CC: Afib    Interval History:   No complaints.    Vital Signs  Temp:  [97.8 °F (36.6 °C)-98.7 °F (37.1 °C)] 98.6 °F (37 °C)  Heart Rate:  [] 110  Resp:  [15] 15  BP: (113-127)/(62-74) 117/62    Intake/Output Summary (Last 24 hours) at 06/01/17 0707  Last data filed at 06/01/17 0400   Gross per 24 hour   Intake             2043 ml   Output             1625 ml   Net              418 ml     Flowsheet Rows         First Filed Value    Admission Height  70\" (177.8 cm) Documented at 05/28/2017 2148    Admission Weight  247 lb (112 kg) Documented at 05/28/2017 2148          PHYSICAL EXAM:  General: No acute distress  Resp:NL Rate, unlabored, clear  CV:NL rate and irregular rhythm, NL PMI, Nl S1 and S2, no Mumur, no gallop, no rub, No JVD. Normal pedal pulses  ABD:Nl sounds, no masses or tenderness, nondistended, no quarding or rebound  Neuro: alert,cooperative and oriented  Extr: 1+ bilateral pretibial edema no cyanosis, moves all extremities      Results Review:      Results from last 7 days  Lab Units 05/30/17  0634   SODIUM mmol/L 132*   POTASSIUM mmol/L 4.5   CHLORIDE mmol/L 93*   TOTAL CO2 mmol/L 26.0   BUN mg/dL 20   CREATININE mg/dL 1.04   GLUCOSE mg/dL 147*   CALCIUM mg/dL 8.7           Results from last 7 days  Lab Units 06/01/17  0513   WBC 10*3/mm3 16.39*   HEMOGLOBIN g/dL 11.3*   HEMATOCRIT % 36.5*   PLATELETS 10*3/mm3 250       Results from last 7 days  Lab Units 06/01/17  0513 05/30/17  0634 05/29/17  0842   INR  1.42* 1.42* 1.45*   APTT seconds  --  34.9  --            Results from last 7 days  Lab Units 05/29/17  0508   MAGNESIUM mg/dL 2.5*         @LABRCNT(bnp)@  I reviewed the patient's new clinical results.  I personally viewed and interpreted the patient's EKG/Telemetry data        Medication Review:   Meds reviewed      niCARdipine 5-15 mg/hr       Assessment/Plan  Principal Problem:  SDH (subdural hematoma). Doing well.   Active Problems:  Atrial fibrillation. HR controled. CHADs2-VASc is 4 high " risk. Will hold on warfarin until ok with Neuro surgery  Cardiomyopathy. lVEF of 50%.  HTN (hypertension). bp ok.  Hyperglycemia  Leukocytosis  Hyponatremia  Brain compression    Kwesi Wallace MD  06/01/17  7:07 AM

## 2017-06-01 NOTE — PROGRESS NOTES
"DAILY PROGRESS NOTE  [facility]    Patient Identification:  Name: Izaiah Ravi  Age: 77 y.o.  Sex: male  :  1940  MRN: [id number]         Primary Care Physician: Luis Tavera MD    Subjective:  Interval History:He feels better. Wants to go home.    Objective:    Scheduled Meds:    atorvastatin 10 mg Oral Nightly   digoxin 125 mcg Oral Daily   docusate sodium 100 mg Oral BID   fluticasone 2 spray Nasal Daily   furosemide 20 mg Oral BID   lisinopril 20 mg Oral Q24H   metoprolol succinate XL 50 mg Oral BID   polyethylene glycol 17 g Oral Daily   terazosin 2 mg Oral Nightly     Continuous Infusions:    niCARdipine 5-15 mg/hr       Vital signs in last 24 hours:  Temp:  [98 °F (36.7 °C)-98.7 °F (37.1 °C)] 98 °F (36.7 °C)  Heart Rate:  [] 99  Resp:  [15-18] 18  BP: (117-131)/(62-72) 122/66    Intake/Output:    Intake/Output Summary (Last 24 hours) at 17 1349  Last data filed at 17 0400   Gross per 24 hour   Intake             2043 ml   Output             1325 ml   Net              718 ml       Exam:  /66 (BP Location: Right arm, Patient Position: Sitting)  Pulse 99  Temp 98 °F (36.7 °C) (Oral)   Resp 18  Ht 70\" (177.8 cm)  Wt 248 lb 10.9 oz (113 kg)  SpO2 93%  BMI 35.68 kg/m2    General Appearance:    Alert, cooperative, no distress   Head:    Normocephalic, without obvious abnormality, bandage over head   Eyes:       Throat:   Lips, tongue, gums normal   Neck:   Supple, symmetrical, trachea midline, no JVD   Lungs:     Clear to auscultation bilaterally, respirations unlabored   Chest Wall:    No tenderness or deformity    Heart:    Regular rate and rhythm, S1 and S2 normal, no murmur,no  Rub or gallop   Abdomen:     Soft, non-tender, bowel sounds active, no masses, no organomegaly    Extremities:   Extremities normal, atraumatic, no cyanosis or edema   Pulses:      Skin:   Skin is warm and dry,  no rashes or palpable lesions   Neurologic:   no focal deficits " noted      [unfilled]  Data Review:    Results from last 7 days  Lab Units 05/30/17  0634 05/29/17  0508 05/28/17  2104   SODIUM mmol/L 132* 132* 130*   POTASSIUM mmol/L 4.5 4.1 4.5   CHLORIDE mmol/L 93* 94* 89*   TOTAL CO2 mmol/L 26.0 21.1* 23.9   BUN mg/dL 20 22 23   CREATININE mg/dL 1.04 1.12 1.37*   GLUCOSE mg/dL 147* 176* 163*   CALCIUM mg/dL 8.7 9.4 9.8       Results from last 7 days  Lab Units 06/01/17  0513 05/30/17  0634 05/29/17  0508   WBC 10*3/mm3 16.39* 16.26* 13.64*   HEMOGLOBIN g/dL 11.3* 11.7* 12.1*   HEMATOCRIT % 36.5* 37.4* 38.3*   PLATELETS 10*3/mm3 250 241 274           Results from last 7 days  Lab Units 05/30/17  0634   HEMOGLOBIN A1C % 5.98*     No results found for: TROPONINT        Results from last 7 days  Lab Units 05/30/17  0634   ALK PHOS U/L 56   BILIRUBIN mg/dL 0.5   ALT (SGPT) U/L 20   AST (SGOT) U/L 19           Results from last 7 days  Lab Units 05/30/17  0634   HEMOGLOBIN A1C % 5.98*     Glucose   Date/Time Value Ref Range Status   05/30/2017 0559 137 (H) 70 - 130 mg/dL Final       Results from last 7 days  Lab Units 06/01/17  0513 05/30/17  0634 05/29/17  0842   INR  1.42* 1.42* 1.45*       Patient Active Problem List   Diagnosis Code   • Atrial fibrillation I48.91   • Cardiomyopathy I42.9   • SDH (subdural hematoma) I62.00   • HTN (hypertension) I10   • Hyperglycemia R73.9   • Leukocytosis D72.829   • Hyponatremia E87.1   • Brain compression G93.5       Assessment:  Principal Problem:    SDH (subdural hematoma)  Active Problems:    Atrial fibrillation    Cardiomyopathy    HTN (hypertension)    Hyperglycemia    Leukocytosis    Hyponatremia    Brain compression      Plan:  Home soon when OK with neurosurgery    Epifanio Cruz MD  6/1/2017  1:49 PM

## 2017-06-01 NOTE — SIGNIFICANT NOTE
06/01/17 1043   Rehab Treatment   Discipline occupational therapist   Treatment Not Performed other (see comments)  (Pt req to check back in pm, waiting for neuro right now.)   Recommendation   OT - Next Appointment 06/01/17

## 2017-06-01 NOTE — PLAN OF CARE
Problem: Patient Care Overview (Adult)  Goal: Plan of Care Review  Outcome: Ongoing (interventions implemented as appropriate)    06/01/17 0940   Coping/Psychosocial Response Interventions   Plan Of Care Reviewed With patient   Outcome Evaluation   Outcome Summary/Follow up Plan Pt has now met all PT goals w/ no concerns noted. Pt and wife advised to use SPC at home if fatigued or balance is impaired.          Problem: Inpatient Physical Therapy  Goal: Bed Mobility Goal LTG- PT  Outcome: Outcome(s) achieved Date Met:  06/01/17 06/01/17 0940   Bed Mobility PT LTG   Bed Mobility PT LTG, Date Goal Reviewed 06/01/17   Bed Mobility PT LTG, Outcome goal met       Goal: Transfer Training Goal 1 LTG- PT  Outcome: Outcome(s) achieved Date Met:  06/01/17 06/01/17 0940   Transfer Training PT LTG   Transfer Training PT LTG, Date Goal Reviewed 06/01/17   Transfer Training PT LTG, Outcome goal met       Goal: Gait Training Goal LTG- PT  Outcome: Outcome(s) achieved Date Met:  06/01/17 06/01/17 0940   Gait Training PT LTG   Gait Training Goal PT LTG, Date Goal Reviewed 06/01/17   Gait Training Goal PT LTG, Outcome goal met

## 2017-06-01 NOTE — PLAN OF CARE
Problem: Patient Care Overview (Adult)  Goal: Plan of Care Review  Outcome: Ongoing (interventions implemented as appropriate)    06/01/17 0751   Coping/Psychosocial Response Interventions   Plan Of Care Reviewed With patient;spouse   Patient Care Overview   Progress progress toward functional goals as expected   Outcome Evaluation   Outcome Summary/Follow up Plan Pt able to ambulate in room with minimal assistance. Pt c/o slight headache, PRN medication given. Pt up to restroom multiple times throughout the night. PVR checked at 0400 was 450. Pt straight cathed, 1225ml of urine obtained.          Problem: Fall Risk (Adult)  Goal: Absence of Falls  Outcome: Ongoing (interventions implemented as appropriate)    06/01/17 0751   Fall Risk (Adult)   Absence of Falls making progress toward outcome         Problem: Pain, Acute (Adult)  Goal: Acceptable Pain Control/Comfort Level  Outcome: Outcome(s) achieved Date Met:  06/01/17 06/01/17 0751   Pain, Acute (Adult)   Acceptable Pain Control/Comfort Level making progress toward outcome

## 2017-06-08 ENCOUNTER — HOSPITAL ENCOUNTER (OUTPATIENT)
Dept: CT IMAGING | Facility: HOSPITAL | Age: 77
Discharge: HOME | End: 2017-06-08
Admitting: NURSE PRACTITIONER

## 2017-06-08 ENCOUNTER — OFFICE VISIT (OUTPATIENT)
Dept: NEUROSURGERY | Facility: CLINIC | Age: 77
End: 2017-06-08

## 2017-06-08 VITALS
SYSTOLIC BLOOD PRESSURE: 111 MMHG | HEART RATE: 112 BPM | DIASTOLIC BLOOD PRESSURE: 61 MMHG | HEIGHT: 70 IN | WEIGHT: 248 LBS | BODY MASS INDEX: 35.5 KG/M2

## 2017-06-08 DIAGNOSIS — S06.5XAA SDH (SUBDURAL HEMATOMA) (HCC): ICD-10-CM

## 2017-06-08 DIAGNOSIS — Z09 SURGICAL FOLLOWUP VISIT: Primary | ICD-10-CM

## 2017-06-08 PROCEDURE — 99024 POSTOP FOLLOW-UP VISIT: CPT | Performed by: NURSE PRACTITIONER

## 2017-06-08 PROCEDURE — 70450 CT HEAD/BRAIN W/O DYE: CPT

## 2017-06-08 NOTE — PROGRESS NOTES
Subjective   Patient ID: Izaiah Ravi is a 77 y.o. male is here today for follow-up. He is 2 weeks out from having a left frontal mera hole and placement of a subdural drain on 5/30/17 performed by Dr. Greene. He is here with a new CT scan. Patient states that he has been doing well.     History of Present Illness        Review of Systems   Neurological: Positive for headaches.   All other systems reviewed and are negative.      Objective   Physical Exam   Constitutional: He is oriented to person, place, and time. He appears well-developed. No distress.   Neurological: He is alert and oriented to person, place, and time. No cranial nerve deficit. Coordination normal.   AA&O x 3. No focal neurologic deficits.    Skin: Skin is warm and dry.   The left frontal cranial incision is well approximated. No redness, swelling or drainage.  Staples were removed without difficulty.   Psychiatric: He has a normal mood and affect.   Vitals reviewed.    Neurologic Exam     Mental Status   Oriented to person, place, and time.       Assessment/Plan   Independent Review of Radiographic Studies:      I have reviewed the repeat CT scan of the brain performed this morning with the patient and Dr. Greene.  The CT shows some continued accumulation of subdural fluid bilaterally but it is certainly improved when compared to the preoperative images.  The left ventricular effacement has resolved.    Medical Decision Making:     Mr. Ravi is about 11 days out from left frontal mera hole by Dr. Greene for chronic subdural hematoma.  He is doing well.  He reports that his headaches are significantly improved but still mildly present when he coughs.  He is walking, eating, voiding and tolerating activities well.  He was on anticoagulation therapy prior to his surgery.  He has a history of atrial fibrillation.    I discussed resumption of anticoagulation with Dr. Greene.  Dr. Greene felt that the chances of re-bleeding on anticoagulation is  significantly lower than the chances of suffering an embolic stroke.  For that reason Dr. Greene has cleared the patient to resume his Coumadin (no loading dose) and 81 mg aspirin.    Mr. Ravi will return to the office in 2 weeks with a repeat head CT for a visit with Dr. Greene. He will call the office if he experiences any gait difficulties, headache or weakness.      Izaiah was seen today for post-op.    Diagnoses and all orders for this visit:    Surgical followup visit  -     CT Head Without Contrast; Future      Return in about 2 weeks (around 6/22/2017).               Body mass index is 35.58 kg/(m^2).

## 2017-06-22 ENCOUNTER — HOSPITAL ENCOUNTER (OUTPATIENT)
Dept: CT IMAGING | Facility: HOSPITAL | Age: 77
Discharge: HOME | End: 2017-06-22
Admitting: NURSE PRACTITIONER

## 2017-06-22 ENCOUNTER — OFFICE VISIT (OUTPATIENT)
Dept: NEUROSURGERY | Facility: CLINIC | Age: 77
End: 2017-06-22

## 2017-06-22 VITALS
HEART RATE: 89 BPM | HEIGHT: 70 IN | DIASTOLIC BLOOD PRESSURE: 63 MMHG | SYSTOLIC BLOOD PRESSURE: 122 MMHG | BODY MASS INDEX: 35.5 KG/M2 | WEIGHT: 248 LBS

## 2017-06-22 DIAGNOSIS — Z09 SURGICAL FOLLOWUP VISIT: ICD-10-CM

## 2017-06-22 DIAGNOSIS — S06.5XAA SDH (SUBDURAL HEMATOMA) (HCC): Primary | ICD-10-CM

## 2017-06-22 PROCEDURE — 99024 POSTOP FOLLOW-UP VISIT: CPT | Performed by: NEUROLOGICAL SURGERY

## 2017-06-22 PROCEDURE — 70450 CT HEAD/BRAIN W/O DYE: CPT

## 2017-06-22 NOTE — PROGRESS NOTES
Subjective   Patient ID: Izaiah Ravi is a 77 y.o. male is here today for follow-up with new head CT. He is 3 weeks out from having a left frontal mera hole and placement of a subdural drain on 5/30/17. He has some headaches and dizziness.    History of Present Illness    This patient is feeling okay overall.  He still has a little dizziness and some headache but nothing severe.  His incision is healing very well.    The following portions of the patient's history were reviewed and updated as appropriate: allergies, current medications, past family history, past medical history, past social history, past surgical history and problem list.    Review of Systems   Respiratory: Negative for chest tightness and shortness of breath.    Cardiovascular: Negative for chest pain.   Neurological: Positive for dizziness and headaches.   All other systems reviewed and are negative.      Objective   Physical Exam   Constitutional: He is oriented to person, place, and time. He appears well-developed and well-nourished.   Neurological: He is oriented to person, place, and time.     Neurologic Exam     Mental Status   Oriented to person, place, and time.       Assessment/Plan   Independent Review of Radiographic Studies:      I reviewed his CT that was done today.  It has not yet been read.  This shows increase in the fluid collections over both cerebral hemispheres.  There is no evidence of any acute blood however and there is no severe mass effect.    Medical Decision Making:      I told the patient and his wife about the imaging.  I told them that from my point of view I would tend to continue to watch this.  I told them that we would scan him again in about 3 weeks.  They are in agreement with this plan.    Izaiah was seen today for post-op and headache.    Diagnoses and all orders for this visit:    SDH (subdural hematoma)  -     CT Head Without Contrast; Future    Return in about 3 weeks (around 7/13/2017).

## 2017-06-22 NOTE — PATIENT INSTRUCTIONS

## 2017-07-13 ENCOUNTER — HOSPITAL ENCOUNTER (OUTPATIENT)
Dept: CT IMAGING | Facility: HOSPITAL | Age: 77
Discharge: HOME | End: 2017-07-13
Attending: NEUROLOGICAL SURGERY | Admitting: NEUROLOGICAL SURGERY

## 2017-07-13 ENCOUNTER — OFFICE VISIT (OUTPATIENT)
Dept: NEUROSURGERY | Facility: CLINIC | Age: 77
End: 2017-07-13

## 2017-07-13 VITALS
WEIGHT: 248 LBS | HEART RATE: 85 BPM | BODY MASS INDEX: 35.5 KG/M2 | HEIGHT: 70 IN | DIASTOLIC BLOOD PRESSURE: 69 MMHG | SYSTOLIC BLOOD PRESSURE: 129 MMHG

## 2017-07-13 DIAGNOSIS — S06.5XAA SDH (SUBDURAL HEMATOMA) (HCC): Primary | ICD-10-CM

## 2017-07-13 DIAGNOSIS — S06.5XAA SDH (SUBDURAL HEMATOMA) (HCC): ICD-10-CM

## 2017-07-13 PROCEDURE — 70450 CT HEAD/BRAIN W/O DYE: CPT

## 2017-07-13 PROCEDURE — 99024 POSTOP FOLLOW-UP VISIT: CPT | Performed by: NEUROLOGICAL SURGERY

## 2017-07-13 NOTE — PATIENT INSTRUCTIONS

## 2017-07-13 NOTE — PROGRESS NOTES
Subjective   Patient ID: Izaiah Ravi is a 77 y.o. male is here today for follow-up with new Head CT. He has been having some mild headaches.    History of Present Illness     This patient is doing okay overall.  He does still have some headaches.  His incision has healed well.    The following portions of the patient's history were reviewed and updated as appropriate: allergies, current medications, past family history, past medical history, past social history, past surgical history and problem list.    Review of Systems   Eyes: Negative for visual disturbance.   Respiratory: Negative for chest tightness and shortness of breath.    Cardiovascular: Negative for chest pain.   Neurological: Positive for headaches. Negative for dizziness.   All other systems reviewed and are negative.      Objective   Physical Exam   Constitutional: He is oriented to person, place, and time. He appears well-developed and well-nourished.   Neurological: He is oriented to person, place, and time.     Neurologic Exam     Mental Status   Oriented to person, place, and time.       Assessment/Plan   Independent Review of Radiographic Studies:      I reviewed his CT scan that was done today.  This shows that the fluid collections on both sides are about the same size area the one on the right might be a little bit smaller.  The one on the left however is about the same size anteriorly although posteriorly may have decreased in size.  There is a little more density to the collections.  They have not been read but I suspect that the radiologist will read this as acute bleeding.  I think this is inaccurate.    Medical Decision Making:      I told the patient and his wife about the imaging.  I told them that from my point of view I would not do anything further right now.  I did tell them that he could drive a mile or 2 to the grocery store but not much more than that.  He should still avoid heavy lifting.  I will check him again in about 4  weeks with another CT.    Izaiah was seen today for follow-up.    Diagnoses and all orders for this visit:    SDH (subdural hematoma)  -     CT Head Without Contrast; Future    Return in about 4 weeks (around 8/10/2017).

## 2017-08-10 ENCOUNTER — OFFICE VISIT (OUTPATIENT)
Dept: NEUROSURGERY | Facility: CLINIC | Age: 77
End: 2017-08-10

## 2017-08-10 ENCOUNTER — HOSPITAL ENCOUNTER (OUTPATIENT)
Dept: CT IMAGING | Facility: HOSPITAL | Age: 77
Discharge: HOME | End: 2017-08-10
Attending: NEUROLOGICAL SURGERY | Admitting: NEUROLOGICAL SURGERY

## 2017-08-10 VITALS
HEIGHT: 70 IN | WEIGHT: 248 LBS | SYSTOLIC BLOOD PRESSURE: 109 MMHG | BODY MASS INDEX: 35.5 KG/M2 | HEART RATE: 95 BPM | DIASTOLIC BLOOD PRESSURE: 67 MMHG

## 2017-08-10 DIAGNOSIS — S06.5XAA SDH (SUBDURAL HEMATOMA) (HCC): ICD-10-CM

## 2017-08-10 DIAGNOSIS — S06.5XAA SDH (SUBDURAL HEMATOMA) (HCC): Primary | ICD-10-CM

## 2017-08-10 PROCEDURE — 99024 POSTOP FOLLOW-UP VISIT: CPT | Performed by: NEUROLOGICAL SURGERY

## 2017-08-10 PROCEDURE — 70450 CT HEAD/BRAIN W/O DYE: CPT

## 2017-08-10 RX ORDER — WARFARIN SODIUM 5 MG/1
TABLET ORAL
COMMUNITY
Start: 2017-07-17

## 2017-08-10 RX ORDER — FUROSEMIDE 40 MG/1
TABLET ORAL
COMMUNITY
Start: 2017-07-15 | End: 2021-04-07

## 2017-08-10 NOTE — PROGRESS NOTES
Subjective   Patient ID: Izaiah Ravi is a 77 y.o. male is here today for follow-up of SDH with new CT scan of the head.  The patient continues on warfarin at this time.    History of Present Illness    This patient returns today.  He feels like he is as good as he has been the entire time we have been following him.  He has had one headache in the last month and took some Tylenol one time.  Otherwise he has no other symptoms.    The following portions of the patient's history were reviewed and updated as appropriate: allergies, current medications, past family history, past medical history, past social history, past surgical history and problem list.    Review of Systems   Constitutional: Negative for fever.   Respiratory: Negative for shortness of breath.    Cardiovascular: Negative for chest pain.   Neurological: Negative for seizures.   All other systems reviewed and are negative.      Objective   Physical Exam   Constitutional: He is oriented to person, place, and time. He appears well-developed and well-nourished.   Neurological: He is oriented to person, place, and time.     Neurologic Exam     Mental Status   Oriented to person, place, and time.       Assessment/Plan   Independent Review of Radiographic Studies:      I reviewed his CT that was done about an hour ago.  This shows marked improvement in the subdural on the left side.  The subdural on the right side has also improved markedly although there is still some fluid on both sides.    Medical Decision Making:      Told the patient and his wife about the CAT scan.  There is not yet a report on the study.  I suggested that we scan him again in about 2 months    Izaiah was seen today for subdural hematoma.    Diagnoses and all orders for this visit:    SDH (subdural hematoma)  -     CT Head Without Contrast; Future    Return in about 2 months (around 10/10/2017).        Body mass index is 35.58 kg/(m^2).

## 2017-10-12 ENCOUNTER — HOSPITAL ENCOUNTER (OUTPATIENT)
Dept: CT IMAGING | Facility: HOSPITAL | Age: 77
Discharge: HOME | End: 2017-10-12
Attending: NEUROLOGICAL SURGERY | Admitting: NEUROLOGICAL SURGERY

## 2017-10-12 ENCOUNTER — OFFICE VISIT (OUTPATIENT)
Dept: NEUROSURGERY | Facility: CLINIC | Age: 77
End: 2017-10-12

## 2017-10-12 VITALS
SYSTOLIC BLOOD PRESSURE: 120 MMHG | WEIGHT: 248 LBS | BODY MASS INDEX: 35.5 KG/M2 | HEART RATE: 104 BPM | DIASTOLIC BLOOD PRESSURE: 59 MMHG | HEIGHT: 70 IN

## 2017-10-12 DIAGNOSIS — S06.5XAA SDH (SUBDURAL HEMATOMA) (HCC): Primary | ICD-10-CM

## 2017-10-12 DIAGNOSIS — S06.5XAA SDH (SUBDURAL HEMATOMA) (HCC): ICD-10-CM

## 2017-10-12 PROCEDURE — 70450 CT HEAD/BRAIN W/O DYE: CPT

## 2017-10-12 PROCEDURE — 99213 OFFICE O/P EST LOW 20 MIN: CPT | Performed by: NEUROLOGICAL SURGERY

## 2017-10-12 NOTE — PROGRESS NOTES
Subjective   Patient ID: Izaiah Ravi is a 77 y.o. male is here today for follow-up on SDH with a new CT scan of the head. Patient reports intermittent headaches, but they are relieved with just one tylenol.    History of Present Illness     This patient returns today.  He is doing quite well overall.    The following portions of the patient's history were reviewed and updated as appropriate: allergies, current medications, past family history, past medical history, past social history, past surgical history and problem list.This patient returns today.  He is doing quite well overall.      Review of Systems   Respiratory: Negative for shortness of breath.    Cardiovascular: Negative for chest pain.   Neurological: Positive for headaches.   All other systems reviewed and are negative.      Objective   Physical Exam   Constitutional: He is oriented to person, place, and time. He appears well-developed and well-nourished.   Neurological: He is oriented to person, place, and time.     Neurologic Exam     Mental Status   Oriented to person, place, and time.       Assessment/Plan   Independent Review of Radiographic Studies:      I reviewed a CT that was done today.  We do not yet have a report.  This shows complete resolution of the subdural hematomas on both sides.  There is no evidence of any blood at all anymore.    Medical Decision Making:      I told the patient that I thought we could stop scanning him at this point.  He is to call if any problems develop in the future.    Izaiah was seen today for subdural hematoma.    Diagnoses and all orders for this visit:    SDH (subdural hematoma)    Return if symptoms worsen or fail to improve.

## 2017-11-30 RX ORDER — METOPROLOL SUCCINATE 50 MG/1
TABLET, EXTENDED RELEASE ORAL
Qty: 180 TABLET | Refills: 3 | Status: SHIPPED | OUTPATIENT
Start: 2017-11-30 | End: 2018-02-22 | Stop reason: SDUPTHER

## 2018-02-22 ENCOUNTER — OFFICE VISIT (OUTPATIENT)
Dept: CARDIOLOGY | Facility: CLINIC | Age: 78
End: 2018-02-22

## 2018-02-22 VITALS
DIASTOLIC BLOOD PRESSURE: 70 MMHG | SYSTOLIC BLOOD PRESSURE: 134 MMHG | HEIGHT: 70 IN | HEART RATE: 82 BPM | BODY MASS INDEX: 37.8 KG/M2 | WEIGHT: 264 LBS

## 2018-02-22 DIAGNOSIS — I48.20 CHRONIC ATRIAL FIBRILLATION (HCC): Primary | ICD-10-CM

## 2018-02-22 DIAGNOSIS — I50.42 CHRONIC COMBINED SYSTOLIC AND DIASTOLIC CONGESTIVE HEART FAILURE (HCC): ICD-10-CM

## 2018-02-22 PROCEDURE — 93000 ELECTROCARDIOGRAM COMPLETE: CPT | Performed by: INTERNAL MEDICINE

## 2018-02-22 PROCEDURE — 99213 OFFICE O/P EST LOW 20 MIN: CPT | Performed by: INTERNAL MEDICINE

## 2018-02-22 NOTE — PROGRESS NOTES
Subjective:     Encounter Date:02/22/2018      Patient ID: Izaiah Ravi is a 77 y.o. male.    Chief Complaint: CAF, CHF    History of Present Illness    Dear Dr. Tavera,     I had the pleasure of seeing your patient in cardiac followup today.  As you well know, he is a rupa 77-year-old man with history of atrial fibrillation and cardiomyopathy.  With medical management his ejection fraction has improved to 50%.      He comes in for his yearly followup.  Over the past year he has done great from a cardiac standpoint.  He has only class I heart failure symptoms.  He has some chronic edema.  Otherwise, he is feeling well and is without complaint.      Over the past year he did have a brain bleed.  He was able to resume his warfarin without difficulty.          Review of Systems   All other systems reviewed and are negative.        ECG 12 Lead  Date/Time: 2/22/2018 12:45 PM  Performed by: CARLOS KNOWLES  Authorized by: CARLOS KNOWLES   Comparison: compared with previous ECG   Similar to previous ECG  Rhythm: atrial fibrillation  BPM: 82  Other findings: PRWP               Objective:     Physical Exam   Constitutional: He is oriented to person, place, and time. He appears well-developed and well-nourished.   HENT:   Head: Normocephalic and atraumatic.   Neck: Normal range of motion. Neck supple.   Cardiovascular: Normal rate and normal heart sounds.  An irregularly irregular rhythm present.   Edema to shins   Pulmonary/Chest: Effort normal and breath sounds normal.   Abdominal: Soft. Bowel sounds are normal.   Musculoskeletal: Normal range of motion.   Neurological: He is alert and oriented to person, place, and time.   Skin: Skin is warm and dry.   Psychiatric: He has a normal mood and affect. His behavior is normal. Thought content normal.   Vitals reviewed.      Lab Review:       Assessment:          Diagnosis Plan   1. Chronic atrial fibrillation     2. Chronic combined systolic and diastolic congestive heart failure             Plan:       It was a pleasure to see your patient in cardiac followup today.  He is doing quite well from the cardiac standpoint.  He is able to tolerate his regimen.  He has class I heart failure symptoms.  He will see me again in one year or sooner if symptoms warrant.      Coronary Artery Disease    Atrial Fibrillation and Atrial Flutter  Assessment  • The patient has permanent atrial fibrillation  • This is non-valvular in etiology  • The patient's CHADS2-VASc score is 3  • A [id number] score of 2 or more is considered a high risk for a thromboembolic event  • Warfarin prescribed  • Aspirin prescribed    Plan  • Continue in atrial fibrillation with rate control  • Continue aspirin and warfarin for antithrombotic therapy, bleeding issues discussed  • Continue beta blocker and digoxin for rate control    Heart Failure  Assessment  • NYHA class I - There is no limitation of physical activity. Physical activity does not cause fatigue, palpitations or shortness of breath.  • ACE inhibitor prescribed  • Beta blocker prescribed  • Diuretics prescribed  • Left ventricular function is normal by qualitative assessment    Plan  • The patient has received heart failure education on the following topics: prognosis/end-of-life issues, dietary sodium restriction, medication instructions, smoking cessation, minimizing or avoiding NSAID use, symptom management, physical activity, weight monitoring, minimizing alcohol intake and referral for visiting nurse, heart failure education program, or management program  • The heart failure care plan was discussed with the patient today including: continuing the current program

## 2019-01-09 RX ORDER — METOPROLOL SUCCINATE 50 MG/1
TABLET, EXTENDED RELEASE ORAL
Qty: 180 TABLET | Refills: 0 | Status: SHIPPED | OUTPATIENT
Start: 2019-01-09 | End: 2019-03-22 | Stop reason: SDUPTHER

## 2019-02-21 ENCOUNTER — OFFICE VISIT (OUTPATIENT)
Dept: CARDIOLOGY | Facility: CLINIC | Age: 79
End: 2019-02-21

## 2019-02-21 VITALS
HEART RATE: 90 BPM | SYSTOLIC BLOOD PRESSURE: 146 MMHG | HEIGHT: 70 IN | DIASTOLIC BLOOD PRESSURE: 74 MMHG | WEIGHT: 267 LBS | BODY MASS INDEX: 38.22 KG/M2

## 2019-02-21 DIAGNOSIS — I50.42 CHRONIC COMBINED SYSTOLIC AND DIASTOLIC CONGESTIVE HEART FAILURE (HCC): ICD-10-CM

## 2019-02-21 DIAGNOSIS — I48.20 CHRONIC ATRIAL FIBRILLATION (HCC): Primary | ICD-10-CM

## 2019-02-21 PROCEDURE — 99213 OFFICE O/P EST LOW 20 MIN: CPT | Performed by: INTERNAL MEDICINE

## 2019-02-21 PROCEDURE — 93000 ELECTROCARDIOGRAM COMPLETE: CPT | Performed by: INTERNAL MEDICINE

## 2019-02-21 NOTE — PROGRESS NOTES
Subjective:     Encounter Date:02/21/2019      Patient ID: Izaiah Ravi is a 78 y.o. male.    Chief Complaint: CAF. CHF    History of Present Illness    Dear Dr. Tavera,    I had the pleasure of seeing your patient in cardiac followup today. As you well know, he is a rupa 78-year-old man with history of chronic atrial fibrillation on anticoagulation. He had a cardiomyopathy but on medical management his ejection fraction improved to 50%. He has no symptoms of congestive heart failure. He still volunteers a few days a week. He says that he gets around but that is about as much exercise as he can do.         Review of Systems   All other systems reviewed and are negative.        ECG 12 Lead  Date/Time: 2/21/2019 12:39 PM  Performed by: Zana Mancini MD  Authorized by: Zana Mancini MD   Comparison: compared with previous ECG   Similar to previous ECG  Rhythm: atrial fibrillation  BPM: 90  Conduction: non-specific intraventricular conduction delay  Q waves: V1-V6                   Objective:     Physical Exam   Constitutional: He is oriented to person, place, and time. He appears well-developed and well-nourished.   HENT:   Head: Normocephalic and atraumatic.   Neck: Normal range of motion. Neck supple.   Cardiovascular: Normal rate and normal heart sounds. An irregularly irregular rhythm present.   Pulmonary/Chest: Effort normal and breath sounds normal.   Abdominal: Soft. Bowel sounds are normal.   Musculoskeletal: Normal range of motion.   Neurological: He is alert and oriented to person, place, and time.   Skin: Skin is warm and dry.   Psychiatric: He has a normal mood and affect. His behavior is normal. Thought content normal.   Vitals reviewed.      Lab Review:       Assessment:          Diagnosis Plan   1. Chronic atrial fibrillation (CMS/HCC)     2. Chronic combined systolic and diastolic congestive heart failure (CMS/HCC)            Plan:       It was a pleasure to see your patient in cardiac followup  today. He is doing well from the cardiac standpoint without complaints related to his atrial fibrillation. He is tolerating anticoagulation without difficulty. He had brain bleed in the past but was able to resume anticoagulation safely.     He has no symptoms of heart failure. I have recommended no changes at this time. He will see me again in 1 year or sooner if symptoms warrant.         Atrial Fibrillation and Atrial Flutter  Assessment  • The patient has permanent atrial fibrillation  • This is non-valvular in etiology  • The patient's CHADS2-VASc score is 3  • A [id number] score of 2 or more is considered a high risk for a thromboembolic event  • Warfarin prescribed  • Aspirin prescribed    Plan  • Continue in atrial fibrillation with rate control  • Continue aspirin and warfarin for antithrombotic therapy, bleeding issues discussed  • Continue beta blocker and digoxin for rate control    Heart Failure  Assessment  • NYHA class I - There is no limitation of physical activity. Physical activity does not cause fatigue, palpitations or shortness of breath.  • ACE inhibitor prescribed  • Beta blocker prescribed  • Diuretics prescribed  • Left ventricular function is normal by qualitative assessment    Plan  • The patient has received heart failure education on the following topics: prognosis/end-of-life issues, dietary sodium restriction, medication instructions, smoking cessation, minimizing or avoiding NSAID use, symptom management, physical activity, weight monitoring, minimizing alcohol intake and referral for visiting nurse, heart failure education program, or management program  • The heart failure care plan was discussed with the patient today including: continuing the current program

## 2019-03-22 RX ORDER — METOPROLOL SUCCINATE 50 MG/1
TABLET, EXTENDED RELEASE ORAL
Qty: 180 TABLET | Refills: 3 | Status: SHIPPED | OUTPATIENT
Start: 2019-03-22 | End: 2020-05-04 | Stop reason: SDUPTHER

## 2020-05-04 RX ORDER — METOPROLOL SUCCINATE 50 MG/1
50 TABLET, EXTENDED RELEASE ORAL 2 TIMES DAILY
Qty: 60 TABLET | Refills: 0 | Status: SHIPPED | OUTPATIENT
Start: 2020-05-04 | End: 2020-06-10

## 2020-05-06 ENCOUNTER — TELEMEDICINE (OUTPATIENT)
Dept: CARDIOLOGY | Facility: CLINIC | Age: 80
End: 2020-05-06

## 2020-05-06 VITALS
HEART RATE: 83 BPM | WEIGHT: 261 LBS | HEIGHT: 70 IN | DIASTOLIC BLOOD PRESSURE: 66 MMHG | BODY MASS INDEX: 37.37 KG/M2 | SYSTOLIC BLOOD PRESSURE: 124 MMHG

## 2020-05-06 DIAGNOSIS — I50.42 CHRONIC COMBINED SYSTOLIC AND DIASTOLIC CONGESTIVE HEART FAILURE (HCC): ICD-10-CM

## 2020-05-06 DIAGNOSIS — I48.20 CHRONIC ATRIAL FIBRILLATION (HCC): Primary | ICD-10-CM

## 2020-05-06 PROCEDURE — 99213 OFFICE O/P EST LOW 20 MIN: CPT | Performed by: NURSE PRACTITIONER

## 2020-05-06 NOTE — PROGRESS NOTES
"    [facility] CARDIOLOGY  [address]  [facility] [address]  Phone: [phone]      Patient Name: Izaiah Ravi  :1940  Age: 80 y.o.  Primary Cardiologist: Dat Clement MD  Encounter Provider:  LINDA Mora      Chief Complaint:   Chief Complaint   Patient presents with   • Follow-up     video         HPI  Izaiah Ravi is a 80 y.o. male who presents today via video visit secondary to COVID pandemic. Patient presents today for annual follow-up.  Patient is a prior patient of  who will be transferring care to Dr. Clement.  Patient has not yet been seen by Dr. Clement.    Pt has a  history significant for chronic atrial fibrillation, combined heart failure.    Patient states that he has done well over the past year.  Patient reports that he is asymptomatic and denies any episodes of chest pain, shortness of breath, palpitations, lightheadedness, swelling or fatigue.   He denies any elevated HR.  He is tolerating his medications without difficulty, specifically no blood in the stool or urine.  He does not exercise routinely.  His blood pressure has been controlled.       The following portions of the patient's history were reviewed and updated as appropriate: allergies, current medications, past family history, past medical history, past social history, past surgical history and problem list.      Review of Systems   Constitution: Negative for malaise/fatigue.   Cardiovascular: Negative for chest pain and leg swelling.   Respiratory: Negative for shortness of breath.    Neurological: Negative for light-headedness.   All other systems reviewed and are negative.      OBJECTIVE:     Vitals:    20 1121   BP: 124/66   Pulse: 83   Weight: 118 kg (261 lb)   Height: 177.8 cm (70\")     Body mass index is 37.45 kg/m².    Physical Exam   Constitutional: He is oriented to person, place, and time. He appears well-developed.   HENT:   Head: " Normocephalic and atraumatic.   Eyes: Conjunctivae are normal.   Pulmonary/Chest: No respiratory distress.   Neurological: He is alert and oriented to person, place, and time. GCS eye subscore is 4. GCS verbal subscore is 5. GCS motor subscore is 6.   Psychiatric: He has a normal mood and affect. His speech is normal and behavior is normal. Judgment and thought content normal. Cognition and memory are normal.         Procedures        ASSESSMENT:      Diagnosis Plan   1. Chronic atrial fibrillation     2. Chronic combined systolic and diastolic congestive heart failure (CMS/[facility])           PLAN OF CARE:     1. Chronic atrial fibrillation.  Patient states that he has been asymptomatic.  He has not had any episodes of chest pain, shortness of breath.  Heart rate has been controlled with metoprolol succinate.  He is anticoagulated with Coumadin and monitors the anticoagulation monitoring clinic at [facility].  He is not having any bleeding complications.  Continue current regimen.  2. Chronic combined heart failure.  Patient is asymptomatic and denies any edema, increased fatigue, shortness of breath.  He will continue with metoprolol succinate.  3. Follow-up with Dr. Clement in 9 months.  Sooner for any problems or complications.    This patient has consented to a telehealth visit via video. I spent 18 minutes in total including but not limited to the direct conversation with this patient. All vitals recorded within this visit are reported by the patient.         Discharge Medications           Accurate as of May 6, 2020 11:44 AM. If you have any questions, ask your nurse or doctor.               Continue These Medications      Instructions Start Date   benazepril 20 MG tablet  Commonly known as:  LOTENSIN   1 tablet, Oral, Daily      CRANBERRY PO   4,200 mg, Oral, Daily      digoxin 125 MCG tablet  Commonly known as:  LANOXIN   Oral, Every Night at Bedtime      diphenhydrAMINE-acetaminophen  MG tablet per  tablet  Commonly known as:  TYLENOL PM   1 tablet, Oral, Nightly PRN      doxazosin 2 MG tablet  Commonly known as:  CARDURA   1 tablet, Oral, Daily      fish oil 1000 MG capsule capsule   2,000 mg, Oral, 2 Times Daily With Meals      fluticasone 50 MCG/ACT nasal spray  Commonly known as:  FLONASE   2 sprays, Nasal, Daily      FOLIC ACID PO   400 mcg, Oral, Daily      furosemide 40 MG tablet  Commonly known as:  LASIX   No dose, route, or frequency recorded.      metoprolol succinate XL 50 MG 24 hr tablet  Commonly known as:  TOPROL-XL   50 mg, Oral, 2 Times Daily      multivitamin capsule   Oral, Daily      simvastatin 20 MG tablet  Commonly known as:  ZOCOR   1 tablet, Oral, Daily      warfarin 5 MG tablet  Commonly known as:  COUMADIN   No dose, route, or frequency recorded.         Stop These Medications    metoprolol tartrate 50 MG tablet  Commonly known as:  LOPRESSOR  Stopped by:  LINDA Mora            Thank you for allowing me to participate in the care of your patient,         LINDA Mora  [facility]  05/06/20  11:34 AM      **Dragon Disclaimer:**  Much of this encounter note is an electronic transcription/translation of spoken language to printed text. The electronic translation of spoken language may permit erroneous, or at times, nonsensical words or phrases to be inadvertently transcribed. Although I have reviewed the note for such errors, some may still exist.

## 2020-06-10 RX ORDER — METOPROLOL SUCCINATE 50 MG/1
TABLET, EXTENDED RELEASE ORAL
Qty: 60 TABLET | Refills: 3 | Status: SHIPPED | OUTPATIENT
Start: 2020-06-10

## 2021-04-07 ENCOUNTER — OFFICE VISIT (OUTPATIENT)
Dept: CARDIOLOGY | Facility: CLINIC | Age: 81
End: 2021-04-07

## 2021-04-07 VITALS
OXYGEN SATURATION: 98 % | HEIGHT: 70 IN | HEART RATE: 75 BPM | BODY MASS INDEX: 37.77 KG/M2 | WEIGHT: 263.8 LBS | SYSTOLIC BLOOD PRESSURE: 118 MMHG | DIASTOLIC BLOOD PRESSURE: 62 MMHG

## 2021-04-07 DIAGNOSIS — I42.8 NICM (NONISCHEMIC CARDIOMYOPATHY) (HCC): ICD-10-CM

## 2021-04-07 DIAGNOSIS — I48.11 LONGSTANDING PERSISTENT ATRIAL FIBRILLATION (HCC): ICD-10-CM

## 2021-04-07 DIAGNOSIS — I50.42 CHRONIC COMBINED SYSTOLIC AND DIASTOLIC CONGESTIVE HEART FAILURE (HCC): ICD-10-CM

## 2021-04-07 DIAGNOSIS — I25.10 CORONARY ARTERY DISEASE INVOLVING NATIVE CORONARY ARTERY OF NATIVE HEART WITHOUT ANGINA PECTORIS: Primary | ICD-10-CM

## 2021-04-07 PROCEDURE — 99214 OFFICE O/P EST MOD 30 MIN: CPT | Performed by: INTERNAL MEDICINE

## 2021-04-07 RX ORDER — ASPIRIN 81 MG/1
81 TABLET, DELAYED RELEASE ORAL DAILY
COMMUNITY

## 2021-04-07 RX ORDER — TORSEMIDE 20 MG/1
20 TABLET ORAL DAILY
Qty: 30 TABLET | Refills: 6 | Status: SHIPPED | OUTPATIENT
Start: 2021-04-07 | End: 2021-11-01 | Stop reason: SDUPTHER

## 2021-04-07 RX ORDER — TORSEMIDE 20 MG/1
20 TABLET ORAL DAILY
COMMUNITY
End: 2021-04-07 | Stop reason: SDUPTHER

## 2021-04-07 NOTE — TELEPHONE ENCOUNTER
OV PER DR FREDERICK    STOP LASIX  40 MG SWITCHING TO TORSEMIDE 20 MG DAILY #30 TO [facility].   LABS- BMP IN 2 WEEKS WHEN HAS ECHO AT [facility].   DERICK LONDON

## 2021-04-09 NOTE — PROGRESS NOTES
Date of Office Visit:  2021  Encounter Provider: Adolfo Clement MD  Place of Service: [facility] CARDIOLOGY  Patient Name: Izaiah Ravi  :1940    Chief complaint: Follow-up for persistent atrial fibrillation, nonischemic cardiomyopathy, chronic combined CHF, and coronary artery disease.    History of Present Illness:    I had the pleasure of seeing the patient in cardiology office on 2021.  He is a very pleasant 81 year-old male with a history of persistent atrial fibrillation, nonischemic cardiomyopathy, chronic combined CHF, and coronary artery disease who presents for follow-up.  The patient has formerly followed with Dr. Estrada, and more recently with Dr. Mancini in our group.    The patient has a longstanding history of persistent atrial fibrillation, which has been treated largely with warfarin.  He did have a subdural hematoma in , but otherwise has not had any major bleeding issues.  He also has a history of chronic combined CHF, with an ejection fraction as low as 30% in .  This was felt to be secondary to rapid atrial fibrillation, and it did improve with rate control.  An echocardiogram in  showed an ejection fraction of 50%.  He also has a history of nonobstructive coronary artery disease, with up to a 60% lesion in an obtuse marginal branch on a catheterization in .  He later had another heart catheterization , although I do not have access to these records.  He has never had stenting or bypass surgery.    The patient presents today to establish care with me.  He has not had any issues with atrial fibrillation in quite some time.  He states that he does not feel it, and his heart rate has been well controlled.  He is taking the warfarin without any difficulty.  He has baseline shortness of breath which is slightly worse than previous, and typically occurs with low to moderate activity.  He has not had any chest discomfort.  He does  have significant lower extremity edema with chronic venous stasis changes.  He states that this has been present for quite some time, but has worsened recently.    Past Medical History:   Diagnosis Date   • Atrial fibrillation (CMS/HCC)    • Congestive cardiomyopathy (CMS/HCC)    • Hyperlipidemia    • Hypertension        Past Surgical History:   Procedure Laterality Date   • SARINA HOLE Left 5/30/2017    Procedure: SARINA HOLE left side;  Surgeon: Maximus Greene MD;  Location: [facility];  Service:    • HERNIA REPAIR      Inguinal       Current Outpatient Medications on File Prior to Visit   Medication Sig Dispense Refill   • aspirin 81 MG EC tablet Take 81 mg by mouth Daily.     • benazepril (LOTENSIN) 20 MG tablet Take 1 tablet by mouth daily.     • CRANBERRY PO Take 4,200 mg by mouth Daily.     • digoxin (LANOXIN) 125 MCG tablet Take  by mouth every night at bedtime.     • diphenhydrAMINE-acetaminophen (TYLENOL PM)  MG tablet per tablet Take 1 tablet by mouth At Night As Needed for sleep.     • Docusate Calcium (STOOL SOFTENER PO) Take  by mouth.     • doxazosin (CARDURA) 2 MG tablet Take 1 tablet by mouth daily.     • Fexofenadine HCl (MUCINEX ALLERGY PO) Take  by mouth.     • fluticasone (FLONASE) 50 MCG/ACT nasal spray 2 sprays into each nostril Daily.     • FOLIC ACID PO Take 400 mcg by mouth Daily.     • Inulin (FIBER CHOICE PO) Take  by mouth.     • metoprolol succinate XL (TOPROL-XL) 50 MG 24 hr tablet TAKE 1 TABLET BY MOUTH  TWICE DAILY 60 tablet 3   • Misc Natural Products (ALLERGY RELEAF SYSTEM PO) Take  by mouth.     • Multiple Vitamin (MULTIVITAMIN) capsule Take  by mouth Daily.     • Omega-3 Fatty Acids (FISH OIL) 1000 MG capsule capsule Take 2,000 mg by mouth 2 (Two) Times a Day With Meals.     • simvastatin (ZOCOR) 20 MG tablet Take 1 tablet by mouth daily.     • warfarin (COUMADIN) 5 MG tablet        No current facility-administered medications on file prior to visit.     Allergies as of  "04/07/2021 - Reviewed 05/06/2020   Allergen Reaction Noted   • Amoxicillin  02/09/2017     Social History     Socioeconomic History   • Marital status:      Spouse name: Not on file   • Number of children: Not on file   • Years of education: Not on file   • Highest education level: Not on file   Tobacco Use   • Smoking status: Former Smoker     Packs/day: 0.00   • Tobacco comment: quit 22 years ago   Substance and Sexual Activity   • Alcohol use: No     Family History   Problem Relation Age of Onset   • Colon cancer Mother    • Diabetes Maternal Uncle    • Heart disease Maternal Uncle    • Stroke Maternal Uncle    • Stroke Maternal Grandfather    • Stroke Paternal Grandfather        Review of Systems   Constitutional: Positive for malaise/fatigue.   Cardiovascular: Positive for dyspnea on exertion and leg swelling.   All other systems reviewed and are negative.     Objective:     Vitals:    04/07/21 1256   BP: 118/62   Pulse: 75   SpO2: 98%   Weight: 120 kg (263 lb 12.8 oz)   Height: 177.8 cm (70\")     Body mass index is 37.85 kg/m².    Constitutional:       Appearance: Healthy appearance. Well-developed.   Eyes:      Conjunctiva/sclera: Conjunctivae normal.   HENT:      Head: Normocephalic and atraumatic.   Pulmonary:      Effort: Pulmonary effort is normal.      Breath sounds: Normal breath sounds.   Cardiovascular:      Normal rate. Regularly irregular rhythm.      Murmurs: There is no murmur.      No gallop.   Edema:     Comments: 2+ edema of the lower extremities bilaterally with chronic venous stasis changes  Abdominal:      Palpations: Abdomen is soft.      Tenderness: There is no abdominal tenderness.   Musculoskeletal:      Cervical back: Neck supple. Skin:     General: Skin is warm.   Neurological:      Mental Status: Alert and oriented to person, place, and time.   Psychiatric:         Behavior: Behavior normal.       Lab Review:   Procedures       Cardiac Procedures:  1.  Echocardiogram on " 12/4/2015: The ejection fraction was 50%.  There was mild LVH.  There was moderate biatrial enlargement.  There was mild aortic insufficiency.  There was mild to moderate tricuspid regurgitation.  The calculated RVSP was 40 mmHg.    Assessment:       Diagnosis Plan   1. Coronary artery disease involving native coronary artery of native heart without angina pectoris     2. NICM (nonischemic cardiomyopathy) (CMS/[facility])     3. Chronic combined systolic and diastolic congestive heart failure (CMS/[facility])     4. Longstanding persistent atrial fibrillation (CMS/[facility])       Plan:       The most concerning issue is the patient's lower extremity edema.  This is fairly substantial.  He states this has been going on for quite some time, but has gotten worse recently.  He has chronic venous stasis changes and skin changes that support the fact that there is a chronic component to this.  He has not had an echocardiogram in quite some time, and I am going to check an echo within the next several weeks.  His last ejection fraction was 50% in 2015, and this certainly could be more diastolic in nature.  Venous stasis also likely contributes to the edema.  I am going to change his Lasix from 20 mg twice a day to torsemide 20 mg once a day.  I feel this will be better absorbed and more potent for diuresis.  I will obtain a BMP in 2 weeks as well.    He is rate is well controlled on the Toprol-XL at 50 mg twice a day and the digoxin at 125 mcg/day.  He also takes benazepril 20 mg/day and Cardura 2 mg/day.  His blood pressure is excellent today at 118/62.  He is not having any issues with taking the warfarin.  He also takes aspirin for his history of coronary artery disease, and he is having no angina.  He will continue on the simvastatin at 20 mg/day.  I will have him follow-up with me in 3 months unless other issues arise.

## 2021-07-14 ENCOUNTER — OFFICE VISIT (OUTPATIENT)
Dept: CARDIOLOGY | Facility: CLINIC | Age: 81
End: 2021-07-14

## 2021-07-14 VITALS
WEIGHT: 270 LBS | BODY MASS INDEX: 38.74 KG/M2 | SYSTOLIC BLOOD PRESSURE: 110 MMHG | HEART RATE: 65 BPM | OXYGEN SATURATION: 85 % | DIASTOLIC BLOOD PRESSURE: 58 MMHG

## 2021-07-14 DIAGNOSIS — I25.10 CORONARY ARTERY DISEASE INVOLVING NATIVE CORONARY ARTERY OF NATIVE HEART WITHOUT ANGINA PECTORIS: ICD-10-CM

## 2021-07-14 DIAGNOSIS — I50.42 CHRONIC COMBINED SYSTOLIC AND DIASTOLIC CONGESTIVE HEART FAILURE (HCC): ICD-10-CM

## 2021-07-14 DIAGNOSIS — I48.11 LONGSTANDING PERSISTENT ATRIAL FIBRILLATION (HCC): Primary | ICD-10-CM

## 2021-07-14 DIAGNOSIS — I42.8 NICM (NONISCHEMIC CARDIOMYOPATHY) (HCC): ICD-10-CM

## 2021-07-14 PROCEDURE — 99213 OFFICE O/P EST LOW 20 MIN: CPT | Performed by: INTERNAL MEDICINE

## 2021-08-07 NOTE — PROGRESS NOTES
Date of Office Visit:  2021  Encounter Provider: Adolfo Clement MD  Place of Service: [facility] CARDIOLOGY  Patient Name: Izaiah Ravi  :1940    Chief complaint: Follow-up for persistent atrial fibrillation, nonischemic cardiomyopathy, chronic combined CHF, chronic lower extremity edema, and coronary artery disease.    History of Present Illness:    I had the pleasure of seeing the patient in cardiology office on 2021.  He is a very pleasant 81 year-old male with a history of persistent atrial fibrillation, nonischemic cardiomyopathy, chronic combined CHF, and coronary artery disease who presents for follow-up.  The patient has formerly followed with Dr. Estrada, and more recently with Dr. Mancini in our group.  He established care with me on 2021.    The patient has a longstanding history of persistent atrial fibrillation, which has been treated largely with warfarin.  He did have a subdural hematoma in , but otherwise has not had any major bleeding issues.  He also has a history of chronic combined CHF, with an ejection fraction as low as 30% in .  This was felt to be secondary to rapid atrial fibrillation, and it did improve with rate control.  An echocardiogram in  showed an ejection fraction of 50%.  He also has a history of nonobstructive coronary artery disease, with up to a 60% lesion in an obtuse marginal branch on a catheterization in .  He later had another heart catheterization , although I do not have access to these records.  He has never had stenting or bypass surgery.    The patient presents today for follow-up.  He has had the same baseline shortness of breath as his last visit, but definitely no worse.  His edema is about the same.  He has not had any chest discomfort or new exertional symptoms.    Past Medical History:   Diagnosis Date   • Atrial fibrillation (CMS/HCC)    • Congestive cardiomyopathy (CMS/HCC)    •  Hyperlipidemia    • Hypertension        Past Surgical History:   Procedure Laterality Date   • SARINA HOLE Left 5/30/2017    Procedure: SARINA HOLE left side;  Surgeon: Maximus Greene MD;  Location: [facility];  Service:    • HERNIA REPAIR      Inguinal       Current Outpatient Medications on File Prior to Visit   Medication Sig Dispense Refill   • aspirin 81 MG EC tablet Take 81 mg by mouth Daily.     • benazepril (LOTENSIN) 20 MG tablet Take 1 tablet by mouth daily.     • CRANBERRY PO Take 4,200 mg by mouth Daily.     • digoxin (LANOXIN) 125 MCG tablet Take  by mouth every night at bedtime.     • diphenhydrAMINE-acetaminophen (TYLENOL PM)  MG tablet per tablet Take 1 tablet by mouth At Night As Needed for sleep.     • Docusate Calcium (STOOL SOFTENER PO) Take  by mouth.     • doxazosin (CARDURA) 2 MG tablet Take 1 tablet by mouth daily.     • Fexofenadine HCl (MUCINEX ALLERGY PO) Take  by mouth.     • fluticasone (FLONASE) 50 MCG/ACT nasal spray 2 sprays into each nostril Daily.     • FOLIC ACID PO Take 400 mcg by mouth Daily.     • Inulin (FIBER CHOICE PO) Take  by mouth.     • metoprolol succinate XL (TOPROL-XL) 50 MG 24 hr tablet TAKE 1 TABLET BY MOUTH  TWICE DAILY 60 tablet 3   • Misc Natural Products (ALLERGY RELEAF SYSTEM PO) Take  by mouth.     • Multiple Vitamin (MULTIVITAMIN) capsule Take  by mouth Daily.     • Omega-3 Fatty Acids (FISH OIL) 1000 MG capsule capsule Take 2,000 mg by mouth 2 (Two) Times a Day With Meals.     • simvastatin (ZOCOR) 20 MG tablet Take 1 tablet by mouth daily.     • torsemide (DEMADEX) 20 MG tablet Take 1 tablet by mouth Daily. 30 tablet 6   • warfarin (COUMADIN) 5 MG tablet        No current facility-administered medications on file prior to visit.     Allergies as of 07/14/2021 - Reviewed 04/08/2021   Allergen Reaction Noted   • Amoxicillin  02/09/2017     Social History     Socioeconomic History   • Marital status:      Spouse name: Not on file   • Number of  children: Not on file   • Years of education: Not on file   • Highest education level: Not on file   Tobacco Use   • Smoking status: Former Smoker     Packs/day: 0.00   • Tobacco comment: quit 22 years ago   Substance and Sexual Activity   • Alcohol use: No     Family History   Problem Relation Age of Onset   • Colon cancer Mother    • Diabetes Maternal Uncle    • Heart disease Maternal Uncle    • Stroke Maternal Uncle    • Stroke Maternal Grandfather    • Stroke Paternal Grandfather        Review of Systems   Constitutional: Positive for malaise/fatigue.   Cardiovascular: Positive for dyspnea on exertion and leg swelling.   All other systems reviewed and are negative.     Objective:     Vitals:    07/14/21 1404   BP: 110/58   Pulse: 65   SpO2: (!) 85%   Weight: 122 kg (270 lb)     Body mass index is 38.74 kg/m².    Constitutional:       Appearance: Healthy appearance. Well-developed.   Eyes:      Conjunctiva/sclera: Conjunctivae normal.   HENT:      Head: Normocephalic and atraumatic.   Pulmonary:      Effort: Pulmonary effort is normal.      Breath sounds: Normal breath sounds.   Cardiovascular:      Normal rate. Regularly irregular rhythm.      Murmurs: There is no murmur.      No gallop.   Edema:     Comments: 2+ edema of the lower extremities bilaterally with chronic venous stasis changes  Abdominal:      Palpations: Abdomen is soft.      Tenderness: There is no abdominal tenderness.   Musculoskeletal:      Cervical back: Neck supple. Skin:     General: Skin is warm.   Neurological:      Mental Status: Alert and oriented to person, place, and time.   Psychiatric:         Behavior: Behavior normal.       Lab Review:   Procedures       Cardiac Procedures:  1.  Echocardiogram on 12/4/2015: The ejection fraction was 50%.  There was mild LVH.  There was moderate biatrial enlargement.  There was mild aortic insufficiency.  There was mild to moderate tricuspid regurgitation.  The calculated RVSP was 40 mmHg.  2.   Echocardiogram on 4/21/2021: The ejection fraction was 60 to 65%.  The left atrium was moderately to severely enlarged.  The right ventricle was mildly enlarged.  There was normal right ventricular function.  The right atrium was severely enlarged.  There was mild mitral regurgitation.  There was mild aortic insufficiency.    Assessment:       Diagnosis Plan   1. Longstanding persistent atrial fibrillation (CMS/HCC)     2. NICM (nonischemic cardiomyopathy) (CMS/HCC)     3. Chronic combined systolic and diastolic congestive heart failure (CMS/HCC)     4. Coronary artery disease involving native coronary artery of native heart without angina pectoris       Plan:       Again, the patient's lower extremity venous stasis changes are chronic, and they have not increased since his last visit.  He is on torsemide at 20 mg/day, which was changed at his last visit.  This does seem to help some.  His echocardiogram showed a normal ejection fraction of 60 to 65% and only mild valvular disease.  He did have fairly significantly enlarged atria.    He is rate is well controlled on the Toprol-XL at 50 mg twice a day and the digoxin at 125 mcg/day.  He also takes benazepril 20 mg/day and Cardura 2 mg/day.  His blood pressure has been controlled.  He is not having any issues with taking the warfarin.  He also takes aspirin for his history of coronary artery disease, and he is having no angina.  He will continue on the simvastatin at 20 mg/day.  I will have him follow-up with me in 6 months unless other issues arise.

## 2021-11-01 RX ORDER — TORSEMIDE 20 MG/1
20 TABLET ORAL DAILY
Qty: 90 TABLET | Refills: 3 | Status: SHIPPED | OUTPATIENT
Start: 2021-11-01

## 2022-02-09 ENCOUNTER — OFFICE VISIT (OUTPATIENT)
Dept: CARDIOLOGY | Facility: CLINIC | Age: 82
End: 2022-02-09

## 2022-02-09 VITALS
WEIGHT: 262 LBS | HEIGHT: 70 IN | DIASTOLIC BLOOD PRESSURE: 64 MMHG | SYSTOLIC BLOOD PRESSURE: 128 MMHG | BODY MASS INDEX: 37.51 KG/M2 | HEART RATE: 84 BPM

## 2022-02-09 DIAGNOSIS — I42.8 NICM (NONISCHEMIC CARDIOMYOPATHY): ICD-10-CM

## 2022-02-09 DIAGNOSIS — I48.11 LONGSTANDING PERSISTENT ATRIAL FIBRILLATION: Primary | ICD-10-CM

## 2022-02-09 DIAGNOSIS — I50.42 CHRONIC COMBINED SYSTOLIC AND DIASTOLIC CONGESTIVE HEART FAILURE: ICD-10-CM

## 2022-02-09 DIAGNOSIS — I25.119 CORONARY ARTERY DISEASE INVOLVING NATIVE CORONARY ARTERY OF NATIVE HEART WITH ANGINA PECTORIS: ICD-10-CM

## 2022-02-09 DIAGNOSIS — R60.0 LOWER EXTREMITY EDEMA: ICD-10-CM

## 2022-02-09 PROCEDURE — 99213 OFFICE O/P EST LOW 20 MIN: CPT | Performed by: INTERNAL MEDICINE

## 2022-02-09 NOTE — PROGRESS NOTES
Date of Office Visit: 2022  Encounter Provider: Adolfo Clement MD  Place of Service: [facility] CARDIOLOGY  Patient Name: Izaiah Ravi  :1940    Chief complaint: Follow-up for persistent atrial fibrillation, nonischemic cardiomyopathy, chronic combined CHF, chronic lower extremity edema, and coronary artery disease.    History of Present Illness:    I had the pleasure of seeing the patient in cardiology office on 2022.  He is a very pleasant 81 year-old male with a history of persistent atrial fibrillation, nonischemic cardiomyopathy, chronic combined CHF, and coronary artery disease who presents for follow-up.  The patient has formerly followed with Dr. Estrada, and more recently with Dr. Mancini in our group.  He established care with me on 2021.    The patient has a longstanding history of persistent atrial fibrillation, which has been treated largely with warfarin.  He did have a subdural hematoma in , but otherwise has not had any major bleeding issues.  He also has a history of chronic combined CHF, with an ejection fraction as low as 30% in .  This was felt to be secondary to rapid atrial fibrillation, and it did improve with rate control.  An echocardiogram in  showed an ejection fraction of 50%.  He also has a history of nonobstructive coronary artery disease, with up to a 60% lesion in an obtuse marginal branch on a catheterization in .  He later had another heart catheterization , although I do not have access to these records.  He has never had stenting or bypass surgery.    The patient presents today for follow-up.  Overall, he has been doing well.  He has had no chest pain, and his shortness of breath is at baseline.  This occurs with moderate or more activity.  His edema is about the same.  He remains in rate controlled atrial fibrillation.  He has had no bleeding issues with the warfarin.    Past Medical History:   Diagnosis  Date   • Atrial fibrillation (HCC)    • Congestive cardiomyopathy (HCC)    • Hyperlipidemia    • Hypertension        Past Surgical History:   Procedure Laterality Date   • SARINA HOLE Left 5/30/2017    Procedure: SARINA HOLE left side;  Surgeon: Maximus Greene MD;  Location: [facility];  Service:    • HERNIA REPAIR      Inguinal       Current Outpatient Medications on File Prior to Visit   Medication Sig Dispense Refill   • aspirin 81 MG EC tablet Take 81 mg by mouth Daily.     • benazepril (LOTENSIN) 20 MG tablet Take 1 tablet by mouth daily.     • CRANBERRY PO Take 4,200 mg by mouth Daily.     • digoxin (LANOXIN) 125 MCG tablet Take  by mouth every night at bedtime.     • diphenhydrAMINE-acetaminophen (TYLENOL PM)  MG tablet per tablet Take 1 tablet by mouth At Night As Needed for sleep.     • Docusate Calcium (STOOL SOFTENER PO) Take  by mouth.     • doxazosin (CARDURA) 2 MG tablet Take 1 tablet by mouth daily.     • Fexofenadine HCl (MUCINEX ALLERGY PO) Take  by mouth.     • fluticasone (FLONASE) 50 MCG/ACT nasal spray 2 sprays into each nostril Daily.     • FOLIC ACID PO Take 400 mcg by mouth Daily.     • Inulin (FIBER CHOICE PO) Take  by mouth.     • metoprolol succinate XL (TOPROL-XL) 50 MG 24 hr tablet TAKE 1 TABLET BY MOUTH  TWICE DAILY 60 tablet 3   • Misc Natural Products (ALLERGY RELEAF SYSTEM PO) Take  by mouth.     • Multiple Vitamin (MULTIVITAMIN) capsule Take  by mouth Daily.     • Omega-3 Fatty Acids (FISH OIL) 1000 MG capsule capsule Take 2,000 mg by mouth 2 (Two) Times a Day With Meals.     • simvastatin (ZOCOR) 20 MG tablet Take 1 tablet by mouth daily.     • torsemide (DEMADEX) 20 MG tablet Take 1 tablet by mouth Daily. 90 tablet 3   • warfarin (COUMADIN) 5 MG tablet        No current facility-administered medications on file prior to visit.     Allergies as of 02/09/2022 - Reviewed 02/09/2022   Allergen Reaction Noted   • Amoxicillin  02/09/2017     Social History     Socioeconomic  "History   • Marital status:    Tobacco Use   • Smoking status: Former Smoker     Packs/day: 0.00   • Tobacco comment: quit 22 years ago   Substance and Sexual Activity   • Alcohol use: No     Family History   Problem Relation Age of Onset   • Colon cancer Mother    • Diabetes Maternal Uncle    • Heart disease Maternal Uncle    • Stroke Maternal Uncle    • Stroke Maternal Grandfather    • Stroke Paternal Grandfather        Review of Systems   Constitutional: Positive for malaise/fatigue.   Cardiovascular: Positive for dyspnea on exertion and leg swelling.   All other systems reviewed and are negative.     Objective:     Vitals:    02/09/22 1048   BP: 128/64   Pulse: 84   Weight: 119 kg (262 lb)   Height: 177.8 cm (70\")     Body mass index is 37.59 kg/m².    Constitutional:       Appearance: Healthy appearance. Well-developed.   Eyes:      Conjunctiva/sclera: Conjunctivae normal.   HENT:      Head: Normocephalic and atraumatic.   Pulmonary:      Effort: Pulmonary effort is normal.      Breath sounds: Normal breath sounds.   Cardiovascular:      Normal rate. Regularly irregular rhythm.      Murmurs: There is no murmur.      No gallop.   Edema:     Comments: 2+ edema of the lower extremities bilaterally with chronic venous stasis changes  Abdominal:      Palpations: Abdomen is soft.      Tenderness: There is no abdominal tenderness.   Musculoskeletal:      Cervical back: Neck supple. Skin:     General: Skin is warm.   Neurological:      Mental Status: Alert and oriented to person, place, and time.   Psychiatric:         Behavior: Behavior normal.       Lab Review:   Procedures       Cardiac Procedures:  1.  Echocardiogram on 12/4/2015: The ejection fraction was 50%.  There was mild LVH.  There was moderate biatrial enlargement.  There was mild aortic insufficiency.  There was mild to moderate tricuspid regurgitation.  The calculated RVSP was 40 mmHg.  2.  Echocardiogram on 4/21/2021: The ejection fraction was 60 " to 65%.  The left atrium was moderately to severely enlarged.  The right ventricle was mildly enlarged.  There was normal right ventricular function.  The right atrium was severely enlarged.  There was mild mitral regurgitation.  There was mild aortic insufficiency.    Assessment:       Diagnosis Plan   1. Longstanding persistent atrial fibrillation ([facility])     2. NICM (nonischemic cardiomyopathy) ([facility])     3. Chronic combined systolic and diastolic congestive heart failure ([facility])     4. Lower extremity edema     5. Coronary artery disease involving native coronary artery of native heart with angina pectoris ([facility])       Plan:        He seems to be stable from a cardiac standpoint.  He has had no new symptoms.  His shortness of breath is at baseline, and occurs with moderate or more activity.  His lower extremity venous stasis changes are chronic, and they are stable from his last visit.  He will continue on the torsemide at 20 mg/day.  His echocardiogram showed a normal ejection fraction of 60 to 65% and only mild valvular disease.  He did have fairly significantly enlarged atria.    He is rate is well controlled on the Toprol-XL at 50 mg twice a day and the digoxin at 125 mcg/day.  He also takes benazepril 20 mg/day and Cardura 2 mg/day.  His blood pressure has been controlled.  He is not having any issues with taking the warfarin.  He also takes aspirin for his history of coronary artery disease, and he is having no angina.  He will continue on the simvastatin at 20 mg/day.  I will have him follow-up with me in 6 months unless other issues arise.

## 2022-08-10 ENCOUNTER — OFFICE VISIT (OUTPATIENT)
Dept: CARDIOLOGY | Facility: CLINIC | Age: 82
End: 2022-08-10

## 2022-08-10 VITALS
DIASTOLIC BLOOD PRESSURE: 60 MMHG | OXYGEN SATURATION: 94 % | BODY MASS INDEX: 38.08 KG/M2 | WEIGHT: 266 LBS | HEART RATE: 104 BPM | SYSTOLIC BLOOD PRESSURE: 136 MMHG | HEIGHT: 70 IN

## 2022-08-10 DIAGNOSIS — I25.10 CORONARY ARTERY DISEASE INVOLVING NATIVE CORONARY ARTERY OF NATIVE HEART WITHOUT ANGINA PECTORIS: ICD-10-CM

## 2022-08-10 DIAGNOSIS — I50.42 CHRONIC COMBINED SYSTOLIC AND DIASTOLIC CONGESTIVE HEART FAILURE: ICD-10-CM

## 2022-08-10 DIAGNOSIS — I42.8 NICM (NONISCHEMIC CARDIOMYOPATHY): ICD-10-CM

## 2022-08-10 DIAGNOSIS — R60.0 LOWER EXTREMITY EDEMA: ICD-10-CM

## 2022-08-10 DIAGNOSIS — I48.11 LONGSTANDING PERSISTENT ATRIAL FIBRILLATION: Primary | ICD-10-CM

## 2022-08-10 PROCEDURE — 99213 OFFICE O/P EST LOW 20 MIN: CPT | Performed by: INTERNAL MEDICINE

## 2022-08-10 RX ORDER — B-COMPLEX WITH VITAMIN C
TABLET ORAL
COMMUNITY

## 2022-08-15 NOTE — PROGRESS NOTES
Date of Office Visit:  8/10/2022  Encounter Provider: Adolfo Clement MD  Place of Service: [facility] CARDIOLOGY  Patient Name: Izaiah Ravi  :1940    Chief complaint: Follow-up for persistent atrial fibrillation, nonischemic cardiomyopathy, chronic combined CHF, chronic lower extremity edema, and coronary artery disease.    History of Present Illness:    I had the pleasure of seeing the patient in cardiology office on 8/10/2022.  He is a very pleasant 82 year-old male with a history of persistent atrial fibrillation, nonischemic cardiomyopathy, chronic combined CHF, and coronary artery disease who presents for follow-up.  The patient has formerly followed with Dr. Estrada, and more recently with Dr. Mancini in our group.  He established care with me on 2021.    The patient has a longstanding history of persistent atrial fibrillation, which has been treated largely with warfarin.  He did have a subdural hematoma in , but otherwise has not had any major bleeding issues.  He also has a history of chronic combined CHF, with an ejection fraction as low as 30% in .  This was felt to be secondary to rapid atrial fibrillation, and it did improve with rate control.  An echocardiogram in  showed an ejection fraction of 50%.  He also has a history of nonobstructive coronary artery disease, with up to a 60% lesion in an obtuse marginal branch on a catheterization in .  He later had another heart catheterization , although I do not have access to these records.  He has never had stenting or bypass surgery.    The patient presents today for follow-up.  Overall, he has been doing well and has had no new issues.  He has had no chest pain, and his shortness of breath is at baseline.  This occurs with moderate or more activity.  His edema is about the same.  He remains in rate controlled atrial fibrillation.  He has had no bleeding issues with the warfarin.    Past  Medical History:   Diagnosis Date   • Atrial fibrillation (HCC)    • Congestive cardiomyopathy (HCC)    • Hyperlipidemia    • Hypertension        Past Surgical History:   Procedure Laterality Date   • SARINA HOLE Left 5/30/2017    Procedure: SARINA HOLE left side;  Surgeon: Maximus Greene MD;  Location: [facility];  Service:    • HERNIA REPAIR      Inguinal       Current Outpatient Medications on File Prior to Visit   Medication Sig Dispense Refill   • aspirin 81 MG EC tablet Take 81 mg by mouth Daily.     • benazepril (LOTENSIN) 20 MG tablet Take 1 tablet by mouth daily.     • CRANBERRY PO Take 4,200 mg by mouth Daily.     • digoxin (LANOXIN) 125 MCG tablet Take  by mouth every night at bedtime.     • diphenhydrAMINE-acetaminophen (TYLENOL PM)  MG tablet per tablet Take 1 tablet by mouth At Night As Needed for sleep.     • Docusate Calcium (STOOL SOFTENER PO) Take  by mouth.     • doxazosin (CARDURA) 2 MG tablet Take 1 tablet by mouth daily.     • Fexofenadine HCl (MUCINEX ALLERGY PO) Take  by mouth.     • fluticasone (FLONASE) 50 MCG/ACT nasal spray 2 sprays into each nostril Daily.     • FOLIC ACID PO Take 400 mcg by mouth Daily.     • Inulin (FIBER CHOICE PO) Take  by mouth.     • metoprolol succinate XL (TOPROL-XL) 50 MG 24 hr tablet TAKE 1 TABLET BY MOUTH  TWICE DAILY 60 tablet 3   • Misc Natural Products (ALLERGY RELEAF SYSTEM PO) Take  by mouth.     • Multiple Vitamin (MULTIVITAMIN) capsule Take  by mouth Daily.     • Omega-3 Fatty Acids (FISH OIL) 1000 MG capsule capsule Take 2,000 mg by mouth 2 (Two) Times a Day With Meals.     • simvastatin (ZOCOR) 20 MG tablet Take 1 tablet by mouth daily.     • torsemide (DEMADEX) 20 MG tablet Take 1 tablet by mouth Daily. 90 tablet 3   • VITAMIN D, CHOLECALCIFEROL, PO Take  by mouth.     • warfarin (COUMADIN) 5 MG tablet      • Zinc 100 MG tablet Take  by mouth.       No current facility-administered medications on file prior to visit.     Allergies as of  "08/10/2022 - Reviewed 08/10/2022   Allergen Reaction Noted   • Amoxicillin  02/09/2017     Social History     Socioeconomic History   • Marital status:    Tobacco Use   • Smoking status: Former Smoker     Packs/day: 0.00   • Tobacco comment: quit 22 years ago   Substance and Sexual Activity   • Alcohol use: No     Family History   Problem Relation Age of Onset   • Colon cancer Mother    • Diabetes Maternal Uncle    • Heart disease Maternal Uncle    • Stroke Maternal Uncle    • Stroke Maternal Grandfather    • Stroke Paternal Grandfather        Review of Systems   Constitutional: Positive for malaise/fatigue.   Cardiovascular: Positive for dyspnea on exertion and leg swelling.   All other systems reviewed and are negative.     Objective:     Vitals:    08/10/22 1237   BP: 136/60   Pulse: 104   SpO2: 94%   Weight: 121 kg (266 lb)   Height: 177.8 cm (70\")     Body mass index is 38.17 kg/m².    Constitutional:       Appearance: Healthy appearance. Well-developed.   Eyes:      Conjunctiva/sclera: Conjunctivae normal.   HENT:      Head: Normocephalic and atraumatic.   Pulmonary:      Effort: Pulmonary effort is normal.      Breath sounds: Normal breath sounds.   Cardiovascular:      Normal rate. Regularly irregular rhythm.      Murmurs: There is no murmur.      No gallop.   Edema:     Comments: 2+ edema of the lower extremities bilaterally with chronic venous stasis changes  Abdominal:      Palpations: Abdomen is soft.      Tenderness: There is no abdominal tenderness.   Musculoskeletal:      Cervical back: Neck supple. Skin:     General: Skin is warm.   Neurological:      Mental Status: Alert and oriented to person, place, and time.   Psychiatric:         Behavior: Behavior normal.       Lab Review:   Procedures       Cardiac Procedures:  1.  Echocardiogram on 12/4/2015: The ejection fraction was 50%.  There was mild LVH.  There was moderate biatrial enlargement.  There was mild aortic insufficiency.  There was " mild to moderate tricuspid regurgitation.  The calculated RVSP was 40 mmHg.  2.  Echocardiogram on 4/21/2021: The ejection fraction was 60 to 65%.  The left atrium was moderately to severely enlarged.  The right ventricle was mildly enlarged.  There was normal right ventricular function.  The right atrium was severely enlarged.  There was mild mitral regurgitation.  There was mild aortic insufficiency.    Assessment:       Diagnosis Plan   1. Longstanding persistent atrial fibrillation (HCC)     2. NICM (nonischemic cardiomyopathy) (HCC)     3. Chronic combined systolic and diastolic congestive heart failure (HCC)     4. Lower extremity edema     5. Coronary artery disease involving native coronary artery of native heart without angina pectoris       Plan:        He seems to be stable from a cardiac standpoint.  He has had no new symptoms.  His shortness of breath is at baseline, and occurs with moderate or more activity.  His lower extremity venous stasis changes are chronic, and they are stable.  He will continue on the torsemide at 20 mg/day.  His echocardiogram from 4/21/2021 showed a normal ejection fraction of 60 to 65% and only mild valvular disease.  He did have fairly significantly enlarged atria.    He is rate is well controlled on the Toprol-XL at 50 mg twice a day and the digoxin at 125 mcg/day.  He also takes benazepril 20 mg/day and Cardura 2 mg/day.  His blood pressure has been controlled.  He is not having any issues with taking the warfarin.  He also takes aspirin for his history of coronary artery disease, and he is having no angina.  He will continue on the simvastatin at 20 mg/day.     I will have him follow-up in 6 months.

## (undated) DEVICE — DISPOSABLE IRRIGATION BIPOLAR CORD, M1000 TYPE: Brand: KIRWAN

## (undated) DEVICE — Device

## (undated) DEVICE — NDL HYPO PRECISIONGLIDE REG 20G 1 1/2

## (undated) DEVICE — GLV SURG BIOGEL LTX PF 7

## (undated) DEVICE — JACKSON-PRATT 100CC BULB RESERVOIR: Brand: CARDINAL HEALTH

## (undated) DEVICE — ANTIBACTERIAL UNDYED BRAIDED (POLYGLACTIN 910), SYNTHETIC ABSORBABLE SUTURE: Brand: COATED VICRYL

## (undated) DEVICE — APPL CHLORAPREP W/TINT 10.5ML PERC STRL

## (undated) DEVICE — SOL IRR PHYSIOSOL BTL 1000ML

## (undated) DEVICE — DRSNG TELFA PAD NONADH STR 1S 3X8IN

## (undated) DEVICE — CONN TBG Y 5 IN 1 LF STRL

## (undated) DEVICE — ZYPHR DISPOSABLE CRANIAL PERFORATOR, LARGE 14/11MM: Brand: ZYPHR

## (undated) DEVICE — PK CRANI 40

## (undated) DEVICE — SMOKE EVACUATION TUBING WITH 7/8 IN TO 1/4 IN REDUCER: Brand: BUFFALO FILTER

## (undated) DEVICE — FLOSEAL MATRIX IS INDICATED IN SURGICAL PROCEDURES (OTHER THAN IN OPHTHALMIC) AS AN ADJUNCT TO HEMOSTASIS WHEN CONTROL OF BLEEDING BY LIGATURE OR CONVENTIONAL PROCEDURES IS INEFFECTIVE OR IMPRACTICAL.: Brand: FLOSEAL HEMOSTATIC MATRIX

## (undated) DEVICE — GLV SURG SENSICARE W/ALOE PF LF 7.5 STRL

## (undated) DEVICE — DIFFUSER: Brand: CORE, MAESTRO

## (undated) DEVICE — OIL CARTRIDGE: Brand: CORE, MAESTRO